# Patient Record
Sex: FEMALE | Race: BLACK OR AFRICAN AMERICAN | NOT HISPANIC OR LATINO | Employment: OTHER | URBAN - METROPOLITAN AREA
[De-identification: names, ages, dates, MRNs, and addresses within clinical notes are randomized per-mention and may not be internally consistent; named-entity substitution may affect disease eponyms.]

---

## 2017-09-04 ENCOUNTER — HOSPITAL ENCOUNTER (INPATIENT)
Facility: OTHER | Age: 52
LOS: 3 days | Discharge: HOME | DRG: 189 | End: 2017-09-08
Attending: EMERGENCY MEDICINE | Admitting: EMERGENCY MEDICINE
Payer: MEDICAID

## 2017-09-04 DIAGNOSIS — E66.01 MORBID OBESITY, UNSPECIFIED OBESITY TYPE: ICD-10-CM

## 2017-09-04 DIAGNOSIS — J20.9 ACUTE BRONCHITIS, UNSPECIFIED ORGANISM: ICD-10-CM

## 2017-09-04 DIAGNOSIS — G47.33 OSA (OBSTRUCTIVE SLEEP APNEA): ICD-10-CM

## 2017-09-04 DIAGNOSIS — J44.1 COPD EXACERBATION: Primary | ICD-10-CM

## 2017-09-04 LAB
ALBUMIN SERPL BCP-MCNC: 4 G/DL
ALLENS TEST: ABNORMAL
ALP SERPL-CCNC: 82 U/L
ALT SERPL W/O P-5'-P-CCNC: 24 U/L
ANION GAP SERPL CALC-SCNC: 13 MMOL/L
AST SERPL-CCNC: 28 U/L
BASOPHILS # BLD AUTO: 0.02 K/UL
BASOPHILS NFR BLD: 0.3 %
BILIRUB SERPL-MCNC: 0.1 MG/DL
BNP SERPL-MCNC: 19 PG/ML
BUN SERPL-MCNC: 11 MG/DL
CALCIUM SERPL-MCNC: 9.8 MG/DL
CHLORIDE SERPL-SCNC: 110 MMOL/L
CO2 SERPL-SCNC: 19 MMOL/L
CREAT SERPL-MCNC: 0.8 MG/DL
DELSYS: ABNORMAL
DIFFERENTIAL METHOD: ABNORMAL
EOSINOPHIL # BLD AUTO: 0.3 K/UL
EOSINOPHIL NFR BLD: 3.9 %
EP: 8
ERYTHROCYTE [DISTWIDTH] IN BLOOD BY AUTOMATED COUNT: 14.5 %
ERYTHROCYTE [SEDIMENTATION RATE] IN BLOOD BY WESTERGREN METHOD: 16 MM/H
EST. GFR  (AFRICAN AMERICAN): >60 ML/MIN/1.73 M^2
EST. GFR  (NON AFRICAN AMERICAN): >60 ML/MIN/1.73 M^2
FIO2: 100
GLUCOSE SERPL-MCNC: 113 MG/DL
HCO3 UR-SCNC: 24.4 MMOL/L (ref 24–28)
HCT VFR BLD AUTO: 35.9 %
HGB BLD-MCNC: 11.7 G/DL
IP: 15
LYMPHOCYTES # BLD AUTO: 2.3 K/UL
LYMPHOCYTES NFR BLD: 33.1 %
MCH RBC QN AUTO: 24.7 PG
MCHC RBC AUTO-ENTMCNC: 32.6 G/DL
MCV RBC AUTO: 76 FL
MIN VOL: 11
MODE: ABNORMAL
MONOCYTES # BLD AUTO: 0.3 K/UL
MONOCYTES NFR BLD: 4 %
NEUTROPHILS # BLD AUTO: 4.1 K/UL
NEUTROPHILS NFR BLD: 58.7 %
PCO2 BLDA: 48.1 MMHG (ref 35–45)
PH SMN: 7.31 [PH] (ref 7.35–7.45)
PLATELET # BLD AUTO: 395 K/UL
PMV BLD AUTO: 9.7 FL
PO2 BLDA: 425 MMHG (ref 80–100)
POC BE: -2 MMOL/L
POC SATURATED O2: 100 % (ref 95–100)
POTASSIUM SERPL-SCNC: 3.7 MMOL/L
PROT SERPL-MCNC: 7.8 G/DL
RBC # BLD AUTO: 4.73 M/UL
SAMPLE: ABNORMAL
SITE: ABNORMAL
SODIUM SERPL-SCNC: 142 MMOL/L
SP02: 97
SPONT RATE: 30
TROPONIN I SERPL DL<=0.01 NG/ML-MCNC: <0.006 NG/ML
WBC # BLD AUTO: 6.95 K/UL

## 2017-09-04 PROCEDURE — 84484 ASSAY OF TROPONIN QUANT: CPT

## 2017-09-04 PROCEDURE — 94645 CONT INHLJ TX EACH ADDL HOUR: CPT

## 2017-09-04 PROCEDURE — 80053 COMPREHEN METABOLIC PANEL: CPT

## 2017-09-04 PROCEDURE — 63600175 PHARM REV CODE 636 W HCPCS: Performed by: EMERGENCY MEDICINE

## 2017-09-04 PROCEDURE — 36600 WITHDRAWAL OF ARTERIAL BLOOD: CPT

## 2017-09-04 PROCEDURE — 25000242 PHARM REV CODE 250 ALT 637 W/ HCPCS

## 2017-09-04 PROCEDURE — 99285 EMERGENCY DEPT VISIT HI MDM: CPT | Mod: 25

## 2017-09-04 PROCEDURE — 96374 THER/PROPH/DIAG INJ IV PUSH: CPT

## 2017-09-04 PROCEDURE — 85025 COMPLETE CBC W/AUTO DIFF WBC: CPT

## 2017-09-04 PROCEDURE — 94660 CPAP INITIATION&MGMT: CPT

## 2017-09-04 PROCEDURE — 99900035 HC TECH TIME PER 15 MIN (STAT)

## 2017-09-04 PROCEDURE — 94644 CONT INHLJ TX 1ST HOUR: CPT

## 2017-09-04 PROCEDURE — 27000190 HC CPAP FULL FACE MASK W/VALVE

## 2017-09-04 PROCEDURE — 93005 ELECTROCARDIOGRAM TRACING: CPT

## 2017-09-04 PROCEDURE — 11000001 HC ACUTE MED/SURG PRIVATE ROOM

## 2017-09-04 PROCEDURE — 83880 ASSAY OF NATRIURETIC PEPTIDE: CPT

## 2017-09-04 PROCEDURE — 93010 ELECTROCARDIOGRAM REPORT: CPT | Mod: ,,, | Performed by: INTERNAL MEDICINE

## 2017-09-04 PROCEDURE — 82803 BLOOD GASES ANY COMBINATION: CPT

## 2017-09-04 RX ORDER — MECLIZINE HYDROCHLORIDE 25 MG/1
25 TABLET ORAL 3 TIMES DAILY PRN
COMMUNITY

## 2017-09-04 RX ORDER — IBUPROFEN 800 MG/1
800 TABLET ORAL 3 TIMES DAILY
Status: ON HOLD | COMMUNITY
End: 2017-09-08 | Stop reason: HOSPADM

## 2017-09-04 RX ORDER — METHYLPREDNISOLONE SOD SUCC 125 MG
125 VIAL (EA) INJECTION ONCE
Status: COMPLETED | OUTPATIENT
Start: 2017-09-04 | End: 2017-09-04

## 2017-09-04 RX ORDER — MONTELUKAST SODIUM 10 MG/1
10 TABLET ORAL NIGHTLY
Status: ON HOLD | COMMUNITY
End: 2023-06-17

## 2017-09-04 RX ORDER — ALBUTEROL SULFATE 0.83 MG/ML
15 SOLUTION RESPIRATORY (INHALATION)
Status: COMPLETED | OUTPATIENT
Start: 2017-09-04 | End: 2017-09-04

## 2017-09-04 RX ORDER — CLONAZEPAM 0.5 MG/1
0.5 TABLET ORAL 2 TIMES DAILY PRN
Status: ON HOLD | COMMUNITY
End: 2023-06-17

## 2017-09-04 RX ORDER — ALBUTEROL SULFATE 0.83 MG/ML
SOLUTION RESPIRATORY (INHALATION)
Status: COMPLETED
Start: 2017-09-04 | End: 2017-09-04

## 2017-09-04 RX ORDER — GABAPENTIN 100 MG/1
100 CAPSULE ORAL 3 TIMES DAILY
Status: ON HOLD | COMMUNITY
End: 2023-06-17

## 2017-09-04 RX ORDER — ATORVASTATIN CALCIUM 40 MG/1
40 TABLET, FILM COATED ORAL DAILY
Status: ON HOLD | COMMUNITY
End: 2023-06-17

## 2017-09-04 RX ORDER — TERBINAFINE HYDROCHLORIDE 250 MG/1
250 TABLET ORAL DAILY
Status: ON HOLD | COMMUNITY
End: 2023-06-17

## 2017-09-04 RX ORDER — SUCRALFATE 1 G/1
1 TABLET ORAL 4 TIMES DAILY
Status: ON HOLD | COMMUNITY
End: 2023-06-17

## 2017-09-04 RX ADMIN — ALBUTEROL SULFATE 15 MG: 0.83 SOLUTION RESPIRATORY (INHALATION) at 11:09

## 2017-09-04 RX ADMIN — ALBUTEROL SULFATE 15 MG: 2.5 SOLUTION RESPIRATORY (INHALATION) at 10:09

## 2017-09-04 RX ADMIN — ALBUTEROL SULFATE 15 MG: 2.5 SOLUTION RESPIRATORY (INHALATION) at 11:09

## 2017-09-04 RX ADMIN — ALBUTEROL SULFATE 15 MG: 0.83 SOLUTION RESPIRATORY (INHALATION) at 10:09

## 2017-09-04 RX ADMIN — METHYLPREDNISOLONE SODIUM SUCCINATE 125 MG: 125 INJECTION, POWDER, FOR SOLUTION INTRAMUSCULAR; INTRAVENOUS at 10:09

## 2017-09-05 LAB
ALLENS TEST: ABNORMAL
HCO3 UR-SCNC: 15.9 MMOL/L (ref 24–28)
PCO2 BLDA: 31.6 MMHG (ref 35–45)
PH SMN: 7.31 [PH] (ref 7.35–7.45)
PO2 BLDA: 67 MMHG (ref 80–100)
POC BE: -10 MMOL/L
POC SATURATED O2: 92 % (ref 95–100)
SAMPLE: ABNORMAL
SITE: ABNORMAL
TROPONIN I SERPL DL<=0.01 NG/ML-MCNC: <0.006 NG/ML

## 2017-09-05 PROCEDURE — 82803 BLOOD GASES ANY COMBINATION: CPT

## 2017-09-05 PROCEDURE — 99223 1ST HOSP IP/OBS HIGH 75: CPT | Mod: ,,, | Performed by: INTERNAL MEDICINE

## 2017-09-05 PROCEDURE — 36600 WITHDRAWAL OF ARTERIAL BLOOD: CPT

## 2017-09-05 PROCEDURE — 27000221 HC OXYGEN, UP TO 24 HOURS

## 2017-09-05 PROCEDURE — 94640 AIRWAY INHALATION TREATMENT: CPT

## 2017-09-05 PROCEDURE — 25000242 PHARM REV CODE 250 ALT 637 W/ HCPCS: Performed by: EMERGENCY MEDICINE

## 2017-09-05 PROCEDURE — 84484 ASSAY OF TROPONIN QUANT: CPT

## 2017-09-05 PROCEDURE — 94660 CPAP INITIATION&MGMT: CPT

## 2017-09-05 PROCEDURE — 27000190 HC CPAP FULL FACE MASK W/VALVE

## 2017-09-05 PROCEDURE — 11000001 HC ACUTE MED/SURG PRIVATE ROOM

## 2017-09-05 PROCEDURE — 99900035 HC TECH TIME PER 15 MIN (STAT)

## 2017-09-05 PROCEDURE — 25000003 PHARM REV CODE 250: Performed by: INTERNAL MEDICINE

## 2017-09-05 PROCEDURE — 25000003 PHARM REV CODE 250: Performed by: NURSE PRACTITIONER

## 2017-09-05 PROCEDURE — 63600175 PHARM REV CODE 636 W HCPCS: Performed by: NURSE PRACTITIONER

## 2017-09-05 PROCEDURE — 25000242 PHARM REV CODE 250 ALT 637 W/ HCPCS: Performed by: NURSE PRACTITIONER

## 2017-09-05 PROCEDURE — 94645 CONT INHLJ TX EACH ADDL HOUR: CPT

## 2017-09-05 RX ORDER — ONDANSETRON 2 MG/ML
4 INJECTION INTRAMUSCULAR; INTRAVENOUS EVERY 12 HOURS PRN
Status: DISCONTINUED | OUTPATIENT
Start: 2017-09-05 | End: 2017-09-08 | Stop reason: HOSPADM

## 2017-09-05 RX ORDER — PREDNISONE 20 MG/1
60 TABLET ORAL DAILY
Status: DISCONTINUED | OUTPATIENT
Start: 2017-09-05 | End: 2017-09-08 | Stop reason: HOSPADM

## 2017-09-05 RX ORDER — ATORVASTATIN CALCIUM 20 MG/1
40 TABLET, FILM COATED ORAL DAILY
Status: DISCONTINUED | OUTPATIENT
Start: 2017-09-05 | End: 2017-09-08 | Stop reason: HOSPADM

## 2017-09-05 RX ORDER — ACETAMINOPHEN 325 MG/1
650 TABLET ORAL EVERY 4 HOURS PRN
Status: DISCONTINUED | OUTPATIENT
Start: 2017-09-05 | End: 2017-09-08 | Stop reason: HOSPADM

## 2017-09-05 RX ORDER — ALBUTEROL SULFATE 0.83 MG/ML
15 SOLUTION RESPIRATORY (INHALATION)
Status: COMPLETED | OUTPATIENT
Start: 2017-09-05 | End: 2017-09-05

## 2017-09-05 RX ORDER — TIOTROPIUM BROMIDE 18 UG/1
18 CAPSULE ORAL; RESPIRATORY (INHALATION) DAILY
Status: DISCONTINUED | OUTPATIENT
Start: 2017-09-05 | End: 2017-09-08 | Stop reason: HOSPADM

## 2017-09-05 RX ORDER — IPRATROPIUM BROMIDE AND ALBUTEROL SULFATE 2.5; .5 MG/3ML; MG/3ML
3 SOLUTION RESPIRATORY (INHALATION) EVERY 4 HOURS PRN
Status: DISCONTINUED | OUTPATIENT
Start: 2017-09-05 | End: 2017-09-08 | Stop reason: HOSPADM

## 2017-09-05 RX ORDER — FLUTICASONE FUROATE AND VILANTEROL 100; 25 UG/1; UG/1
1 POWDER RESPIRATORY (INHALATION) DAILY
Status: DISCONTINUED | OUTPATIENT
Start: 2017-09-05 | End: 2017-09-08 | Stop reason: HOSPADM

## 2017-09-05 RX ORDER — ENOXAPARIN SODIUM 100 MG/ML
40 INJECTION SUBCUTANEOUS EVERY 24 HOURS
Status: DISCONTINUED | OUTPATIENT
Start: 2017-09-05 | End: 2017-09-08 | Stop reason: HOSPADM

## 2017-09-05 RX ORDER — IPRATROPIUM BROMIDE AND ALBUTEROL SULFATE 2.5; .5 MG/3ML; MG/3ML
3 SOLUTION RESPIRATORY (INHALATION) EVERY 4 HOURS
Status: DISCONTINUED | OUTPATIENT
Start: 2017-09-05 | End: 2017-09-08 | Stop reason: HOSPADM

## 2017-09-05 RX ORDER — CLONAZEPAM 0.5 MG/1
0.5 TABLET ORAL 2 TIMES DAILY
Status: DISCONTINUED | OUTPATIENT
Start: 2017-09-05 | End: 2017-09-08 | Stop reason: HOSPADM

## 2017-09-05 RX ORDER — GABAPENTIN 100 MG/1
100 CAPSULE ORAL 3 TIMES DAILY
Status: DISCONTINUED | OUTPATIENT
Start: 2017-09-05 | End: 2017-09-08 | Stop reason: HOSPADM

## 2017-09-05 RX ORDER — FAMOTIDINE 20 MG/1
20 TABLET, FILM COATED ORAL 2 TIMES DAILY
Status: DISCONTINUED | OUTPATIENT
Start: 2017-09-05 | End: 2017-09-08 | Stop reason: HOSPADM

## 2017-09-05 RX ORDER — DOXYCYCLINE HYCLATE 100 MG
100 TABLET ORAL DAILY
Status: DISCONTINUED | OUTPATIENT
Start: 2017-09-05 | End: 2017-09-08 | Stop reason: HOSPADM

## 2017-09-05 RX ORDER — MONTELUKAST SODIUM 10 MG/1
10 TABLET ORAL NIGHTLY
Status: DISCONTINUED | OUTPATIENT
Start: 2017-09-05 | End: 2017-09-08 | Stop reason: HOSPADM

## 2017-09-05 RX ORDER — DOXYCYCLINE 100 MG/1
100 CAPSULE ORAL EVERY 12 HOURS
Status: DISCONTINUED | OUTPATIENT
Start: 2017-09-05 | End: 2017-09-05

## 2017-09-05 RX ORDER — SUCRALFATE 1 G/1
1 TABLET ORAL 4 TIMES DAILY
Status: DISCONTINUED | OUTPATIENT
Start: 2017-09-05 | End: 2017-09-08 | Stop reason: HOSPADM

## 2017-09-05 RX ADMIN — MONTELUKAST SODIUM 10 MG: 10 TABLET, FILM COATED ORAL at 09:09

## 2017-09-05 RX ADMIN — GABAPENTIN 100 MG: 100 CAPSULE ORAL at 01:09

## 2017-09-05 RX ADMIN — ONDANSETRON 4 MG: 2 INJECTION INTRAMUSCULAR; INTRAVENOUS at 09:09

## 2017-09-05 RX ADMIN — FAMOTIDINE 20 MG: 20 TABLET, FILM COATED ORAL at 08:09

## 2017-09-05 RX ADMIN — GABAPENTIN 100 MG: 100 CAPSULE ORAL at 06:09

## 2017-09-05 RX ADMIN — ALBUTEROL SULFATE 15 MG: 2.5 SOLUTION RESPIRATORY (INHALATION) at 01:09

## 2017-09-05 RX ADMIN — SUCRALFATE 1 G: 1 TABLET ORAL at 05:09

## 2017-09-05 RX ADMIN — IPRATROPIUM BROMIDE AND ALBUTEROL SULFATE 3 ML: .5; 3 SOLUTION RESPIRATORY (INHALATION) at 07:09

## 2017-09-05 RX ADMIN — CLONAZEPAM 0.5 MG: 0.5 TABLET ORAL at 09:09

## 2017-09-05 RX ADMIN — IPRATROPIUM BROMIDE AND ALBUTEROL SULFATE 3 ML: .5; 3 SOLUTION RESPIRATORY (INHALATION) at 11:09

## 2017-09-05 RX ADMIN — DOXYCYCLINE HYCLATE 100 MG: 100 TABLET, COATED ORAL at 08:09

## 2017-09-05 RX ADMIN — GABAPENTIN 100 MG: 100 CAPSULE ORAL at 09:09

## 2017-09-05 RX ADMIN — ENOXAPARIN SODIUM 40 MG: 100 INJECTION SUBCUTANEOUS at 05:09

## 2017-09-05 RX ADMIN — CLONAZEPAM 0.5 MG: 0.5 TABLET ORAL at 11:09

## 2017-09-05 RX ADMIN — PREDNISONE 60 MG: 20 TABLET ORAL at 08:09

## 2017-09-05 RX ADMIN — SUCRALFATE 1 G: 1 TABLET ORAL at 06:09

## 2017-09-05 RX ADMIN — TIOTROPIUM BROMIDE 18 MCG: 18 CAPSULE ORAL; RESPIRATORY (INHALATION) at 07:09

## 2017-09-05 RX ADMIN — FAMOTIDINE 20 MG: 20 TABLET, FILM COATED ORAL at 09:09

## 2017-09-05 RX ADMIN — SUCRALFATE 1 G: 1 TABLET ORAL at 11:09

## 2017-09-05 RX ADMIN — IPRATROPIUM BROMIDE AND ALBUTEROL SULFATE 3 ML: .5; 3 SOLUTION RESPIRATORY (INHALATION) at 03:09

## 2017-09-05 RX ADMIN — ATORVASTATIN CALCIUM 40 MG: 20 TABLET, FILM COATED ORAL at 08:09

## 2017-09-05 RX ADMIN — FLUTICASONE FUROATE AND VILANTEROL TRIFENATATE 1 PUFF: 100; 25 POWDER RESPIRATORY (INHALATION) at 07:09

## 2017-09-05 NOTE — ED NOTES
"Pt continues to disconnect herself from the EKG monitor and continues to take her B/P cuff off. Pt advised she needed to be monitored continually while she is in the ER. Pt said, "I can't have all them wires on me".   "

## 2017-09-05 NOTE — ED NOTES
Rounding on the pt has been done. Pt is A & O x 3, appropriate and resting comfortably. Pt is currently receiving a breathing treatment. Skin is warm and dry w/ pink mucosa. NO redness, flushing or diaphoresis observed. Pt denies fever, chills and N/V/D at this time. VS. The pt has been updated on the POC and is ready for admit. Pt pain was addressed and is a 0/10. Pt is currently in position of comfort and covered w/ a blanket for warmth. Pt denies the need for the restroom at this time. Call bell is w/in reach. The pt was advised when a reassessment would take place and pt agreed. Will continue to monitor closely.

## 2017-09-05 NOTE — ED PROVIDER NOTES
"Encounter Date: 9/4/2017    SCRIBE #1 NOTE: I, Thais Retana , am scribing for, and in the presence of, Dr. Hastings.       History     Chief Complaint   Patient presents with    Shortness of Breath     w/ wheezing, came in on CPAP, unable to talk, denies chest pain     Time seen by provider: 10:23 PM    This is a 52 y.o. Female, with history of asthma and COPD, who presents via EMS with complaint of shortness of breath that began two days ago. The patient's sister notified EMS after SOB progressively worsened. Per EMS, BP was 150/110. CPAP machine put in place en route. Pt denies chest pain. She denies use of home oxygen, and is compliant with medication. Pt was intubated one week ago.        The history is provided by the patient and the EMS personnel. The history is limited by the condition of the patient.     Review of patient's allergies indicates:   Allergen Reactions    Aspirin Anaphylaxis    Dilaudid [hydromorphone (bulk)] Other (See Comments)     Pt states dilaudid "makes me paranoid - real spooked". Denies s/s anaphylaxis.      Past Medical History:   Diagnosis Date    Anxiety     Asthma     Bronchitis     COPD (chronic obstructive pulmonary disease)     Depression     GERD (gastroesophageal reflux disease)     Hypertension     Schizophrenia      Past Surgical History:   Procedure Laterality Date    HERNIA REPAIR      none       Family History   Problem Relation Age of Onset    Hypertension Mother     Pneumonia Mother     Hypertension Father     Cancer Father     Diabetes Father      Social History   Substance Use Topics    Smoking status: Former Smoker     Packs/day: 3.00     Types: Cigarettes     Quit date: 7/1/2016    Smokeless tobacco: Never Used      Comment: Smoked up to 3 ppd for 33 years.    Alcohol use Yes      Comment: occassional     Review of Systems   Unable to perform ROS: Severe respiratory distress       Physical Exam     Initial Vitals [09/04/17 2227]   BP Pulse Resp Temp " SpO2   121/84 (!) 114 (!) 30 97.4 °F (36.3 °C) 99 %      MAP       96.33         Physical Exam    Nursing note and vitals reviewed.  Constitutional: She appears well-developed and well-nourished. She is not diaphoretic. She appears distressed.   HENT:   Head: Normocephalic and atraumatic.   Right Ear: External ear normal.   Left Ear: External ear normal.   Eyes: EOM are normal. Right eye exhibits no discharge. Left eye exhibits no discharge.   Neck: Normal range of motion.   Cardiovascular: Regular rhythm and normal heart sounds. Tachycardia present.  Exam reveals no gallop and no friction rub.    No murmur heard.  Pulmonary/Chest: She is in respiratory distress. She has wheezes. She has no rhonchi. She has no rales.   Pt arrives on CPAP. Diffuse wheezes. Mild respiratory distress.    Abdominal: Soft. There is no tenderness. There is no rebound and no guarding.   Musculoskeletal: Normal range of motion. She exhibits no edema or tenderness.   No lower extremity edema.   Neurological: She is alert and oriented to person, place, and time.   Skin: Skin is warm and dry. No rash and no abscess noted. No erythema. No pallor.   Psychiatric: She has a normal mood and affect. Her behavior is normal. Judgment and thought content normal.         ED Course   Critical Care  Date/Time: 9/4/2017 10:35 PM  Performed by: ANIVAL BRYANT.  Authorized by: ANIVAL BRYANT   Direct patient critical care time: 15 minutes  Additional history critical care time: 10 minutes  Ordering / reviewing critical care time: 10 minutes  Documentation critical care time: 5 minutes  Consulting other physicians critical care time: 5 minutes  Total critical care time (exclusive of procedural time) : 45 minutes  Critical care time was exclusive of separately billable procedures and treating other patients.  Critical care was necessary to treat or prevent imminent or life-threatening deterioration of the following conditions: respiratory failure.  Critical  care was time spent personally by me on the following activities: examination of patient, obtaining history from patient or surrogate, evaluation of patient's response to treatment, interpretation of cardiac output measurements, ordering and performing treatments and interventions, ordering and review of laboratory studies, ordering and review of radiographic studies, re-evaluation of patient's condition, review of old charts and discussions with consultants.        Labs Reviewed   CBC W/ AUTO DIFFERENTIAL - Abnormal; Notable for the following:        Result Value    Hemoglobin 11.7 (*)     Hematocrit 35.9 (*)     MCV 76 (*)     MCH 24.7 (*)     Platelets 395 (*)     All other components within normal limits   COMPREHENSIVE METABOLIC PANEL - Abnormal; Notable for the following:     CO2 19 (*)     Glucose 113 (*)     All other components within normal limits   ISTAT PROCEDURE - Abnormal; Notable for the following:     POC PH 7.313 (*)     POC PCO2 48.1 (*)     POC PO2 425 (*)     All other components within normal limits   TROPONIN I   B-TYPE NATRIURETIC PEPTIDE   TROPONIN I     Imaging Results          X-Ray Chest AP Portable (Final result)  Result time 09/04/17 22:53:12    Final result by Anh Mehta MD (09/04/17 22:53:12)                 Impression:      As above.      Electronically signed by: ANH MEHTA MD  Date:     09/04/17  Time:    22:53              Narrative:    Chest AP single view portable.  Comparison: 12/2016.    Cardiac silhouette is stable in size.  Lungs are hypoinflated which accentuates pulmonary vascular markings.  Difficult to exclude potential mild pulmonary edema.  No evidence of focal consolidation, pneumothorax, or large effusion.  Bones show DJD.                              EKG Readings: (Independently Interpreted)   Initial Reading: No STEMI.   Normal sinus rhythm at a rate of 94 bpm. Low voltage.        X-Rays:   Independently Interpreted Readings:   Chest X-Ray: No effusions or  opacities.     Medical Decision Making:   Clinical Tests:   Lab Tests: Ordered and Reviewed  Radiological Study: Ordered and Reviewed  Medical Tests: Ordered and Reviewed  ED Management:  Chronically ill-appearing patient transfer by EMS.  Arrives in respiratory distress.  CPAP on.  As workup proceeds it seems more of a COPD exacerbation rather than a CHF picture.  BNP less than 100.  Has some improvement with that one hour breathing treatment.  Able to remove BiPAP.  Still very wheezy with transient hypoxia.  Will require more related treatments.  Hospitalized with the [facility] service.    ONDINA Hastings M.D.  09/05/2017  3:52 AM      2:26 AM- Discussed and consulted case with Prieto MASON, who will accept the admission.             Scribe Attestation:   Scribe #1: I performed the above scribed service and the documentation accurately describes the services I performed. I attest to the accuracy of the note.    Attending Attestation:           Physician Attestation for Scribe:  Physician Attestation Statement for Scribe #1: I, Dr. Hastings, reviewed documentation, as scribed by Thais Retana  in my presence, and it is both accurate and complete.                 ED Course      Clinical Impression:     1. COPD exacerbation                                 Laureano Hastings MD  09/05/17 0353

## 2017-09-05 NOTE — PLAN OF CARE
CM met with pt for initial discharge planning assessment. Pt states she lives alone, but has family who assists. Pt states she had a sleep study on Aug 29, 2017, at [facility] on [address], [phone] and is waiting for results. CM spoke with Denise at [facility], who will fax me the results which should be ready by tomorrow. Pt signed MJ form for CM to fax to [facility].   CM added pt's new PCP to Epic form. Pt likely to need home O2 for discharge as well as BiPap. CM to follow for arrangements.     09/05/17 1612   Discharge Assessment   Assessment Type Discharge Planning Assessment   Confirmed/corrected address and phone number on facesheet? Yes   Assessment information obtained from? Patient;Medical Record   Expected Length of Stay (days) 3   Prior to hospitilization cognitive status: Alert/Oriented   Prior to hospitalization functional status: Independent   Current cognitive status: Alert/Oriented   Current Functional Status: Independent   Facility Arrived From: home   Lives With alone   Able to Return to Prior Arrangements yes   Is patient able to care for self after discharge? Yes   Readmission Within The Last 30 Days no previous admission in last 30 days   Patient currently being followed by outpatient case management? No   Patient currently receives any other outside agency services? No   Equipment Currently Used at Home cane, straight;nebulizer   Do you have any problems affording any of your prescribed medications? No   Is the patient taking medications as prescribed? yes   Does the patient have transportation home? Yes   Does the patient receive services at the [facility]? No   Discharge Plan A Home   Discharge Plan B Home   Patient/Family In Agreement With Plan yes

## 2017-09-05 NOTE — HPI
This is a 52 y.o. Female, with history of asthma and COPD, who presents via EMS with complaint of shortness of breath that began two days ago. The patient's sister notified EMS after SOB progressively worsened. Per EMS, BP was 150/110. CPAP machine put in place en route. Pt denies chest pain. She denies use of home oxygen, and is compliant with medication. Pt was intubated one week ago at another facility.

## 2017-09-05 NOTE — ASSESSMENT & PLAN NOTE
PCO2-48  Bipap 10/5   Prednisone 60 mg x 5 days  Duonebs Q4 PRN  Continue home Breo, Singulair, and Spiriva  Doxycycline 100mg daily

## 2017-09-05 NOTE — SUBJECTIVE & OBJECTIVE
"Past Medical History:   Diagnosis Date    Anxiety     Asthma     Bronchitis     COPD (chronic obstructive pulmonary disease)     Depression     GERD (gastroesophageal reflux disease)     Hypertension     Schizophrenia        Past Surgical History:   Procedure Laterality Date    HERNIA REPAIR      none         Review of patient's allergies indicates:   Allergen Reactions    Aspirin Anaphylaxis    Dilaudid [hydromorphone (bulk)] Other (See Comments)     Pt states dilaudid "makes me paranoid - real spooked". Denies s/s anaphylaxis.        No current facility-administered medications on file prior to encounter.      Current Outpatient Prescriptions on File Prior to Encounter   Medication Sig    albuterol 90 mcg/actuation inhaler Inhale 2 puffs into the lungs every 4 (four) hours as needed for Wheezing or Shortness of Breath.    albuterol-ipratropium 2.5mg-0.5mg/3mL (DUO-NEB) 0.5 mg-3 mg(2.5 mg base)/3 mL nebulizer solution Take 3 mLs by nebulization every 4 (four) hours as needed for Wheezing or Shortness of Breath.    amlodipine (NORVASC) 10 MG tablet Take 1 tablet (10 mg total) by mouth once daily.    famotidine (PEPCID AC) 20 MG tablet Take 1 tablet (20 mg total) by mouth 2 (two) times daily.    fluticasone-vilanterol (BREO) 100-25 mcg/dose diskus inhaler Inhale 1 puff into the lungs once daily.    losartan (COZAAR) 100 MG tablet Take 100 mg by mouth once daily.    tiotropium (SPIRIVA) 18 mcg inhalation capsule Inhale 1 capsule (18 mcg total) into the lungs once daily.    tramadol (ULTRAM) 50 mg tablet Take 1 tablet (50 mg total) by mouth 2 (two) times daily as needed for Pain.     Family History     Problem Relation (Age of Onset)    Cancer Father    Diabetes Father    Hypertension Mother, Father    Pneumonia Mother        Social History Main Topics    Smoking status: Former Smoker     Packs/day: 3.00     Types: Cigarettes     Quit date: 7/1/2016    Smokeless tobacco: Never Used      Comment: " Smoked up to 3 ppd for 33 years.    Alcohol use Yes      Comment: occassional    Drug use: No    Sexual activity: No     Review of Systems   Constitutional: Positive for activity change, appetite change, fatigue and fever.   HENT: Negative for congestion, ear pain, rhinorrhea and sinus pressure.    Eyes: Negative for pain and discharge.   Respiratory: Positive for cough, chest tightness, shortness of breath and wheezing.    Cardiovascular: Negative for chest pain and leg swelling.   Gastrointestinal: Negative for abdominal distention, abdominal pain, diarrhea, nausea and vomiting.   Endocrine: Negative for cold intolerance and heat intolerance.   Genitourinary: Negative for difficulty urinating, flank pain, frequency, hematuria and urgency.   Musculoskeletal: Negative for arthralgias, joint swelling and myalgias.   Skin: Positive for pallor.   Allergic/Immunologic: Negative for environmental allergies and food allergies.   Neurological: Negative for dizziness, weakness, light-headedness and headaches.   Hematological: Does not bruise/bleed easily.   Psychiatric/Behavioral: Negative for agitation, behavioral problems and decreased concentration.     Objective:     Vital Signs (Most Recent):  Temp: 97.4 °F (36.3 °C) (09/04/17 2227)  Pulse: 94 (09/05/17 0155)  Resp: 20 (09/05/17 0155)  BP: (!) 96/46 (09/05/17 0016)  SpO2: 96 % (09/05/17 0016) Vital Signs (24h Range):  Temp:  [97.4 °F (36.3 °C)] 97.4 °F (36.3 °C)  Pulse:  [] 94  Resp:  [17-44] 20  SpO2:  [96 %-99 %] 96 %  BP: ()/(46-84) 96/46     Weight: 104.3 kg (230 lb)  Body mass index is 40.74 kg/m².    Physical Exam   Constitutional: She is oriented to person, place, and time. She appears well-developed and well-nourished. She appears lethargic.   HENT:   Head: Normocephalic.   Eyes: Conjunctivae are normal. Right eye exhibits no discharge. Left eye exhibits no discharge.   Neck: Normal range of motion. Neck supple.   Cardiovascular: Normal rate,  regular rhythm, normal heart sounds and intact distal pulses.    Pulmonary/Chest: Tachypnea noted. She is in respiratory distress. She has wheezes in the left upper field. She has rhonchi in the left upper field.   Abdominal: Soft. Bowel sounds are normal. She exhibits no distension. There is no tenderness.   Musculoskeletal: Normal range of motion.   Neurological: She is oriented to person, place, and time. She has normal strength. She appears lethargic. GCS eye subscore is 4. GCS verbal subscore is 5. GCS motor subscore is 6.   Skin: Skin is warm, dry and intact. Capillary refill takes 2 to 3 seconds.   Psychiatric: She has a normal mood and affect. Her speech is normal and behavior is normal. Judgment and thought content normal. Cognition and memory are normal.        Significant Labs:   CBC:   Recent Labs  Lab 09/04/17  2241   WBC 6.95   HGB 11.7*   HCT 35.9*   *     CMP:   Recent Labs  Lab 09/04/17  2241      K 3.7      CO2 19*   *   BUN 11   CREATININE 0.8   CALCIUM 9.8   PROT 7.8   ALBUMIN 4.0   BILITOT 0.1   ALKPHOS 82   AST 28   ALT 24   ANIONGAP 13   EGFRNONAA >60       Significant Imaging: I have reviewed all pertinent imaging results/findings within the past 24 hours.

## 2017-09-05 NOTE — ED NOTES
Pt c/o SOB x 2 days, progressively getting worse. Pt is A & O x 3, denies chest pain, fever, chills, productive cough, congestion and N/V/D. Obvious respiratory distress noted. Respirations are even and labored.   + nasal flaring and no use of accessory muscles. Pt is able to maintain own airway. Audible wheezing observed. Skin is warm and dry w/ pink mucosa. Skin is not cyanotic,clammy or diaphoretic. No redness or flushing to the face. VS. SID x 3mm. No JVD. BBS -wet w/ wheezing to all lobes. All fields equal upon auscultation. =chest rise observed. Abd- SNT. BS x 4 quadrants. Pt denies dysuria and constipation. PSM x 4 exts. STALLWORTH w/out difficulty. +2 pulses x 4 exts. No swelling, redness, difference in temperature or deformity to exts x 4. Bed is locked and in the low position w/ the side rails up and locked for safety. Call bell @ BS. Will continue to monitor closely.

## 2017-09-05 NOTE — PROGRESS NOTES
Was called to pt's room @ 0650 AM to place pt on BIPAP due to pt complaining of SOB. Treatments given along with DPI's with LANCE. Pt tolerated therapy well. Pt was on BIPAP throughout the day with minimal breaks in between to eat or conversate with family. Pt tolerated BIPAP well. No changes made. Will continue to monitor.

## 2017-09-05 NOTE — ED NOTES
Pt c/o the tech brought her the wrong crackers, the white ones, pt request the shauna crackers. Pt was told she would receive the shauna crackers at the earliest possible convenience possible. Pt was satisfied.

## 2017-09-05 NOTE — ED NOTES
Rounding on the pt has been done. Pt is A & O x 3, appropriate and resting comfortably. Pt states she is breathing a little easier and respirations are even and unlabored. Skin is warm and dry w/ pink mucosa. NO redness, flushing or diaphoresis observed. Pt denies fever, chills and N/V/D at this time. VS. The pt has been updated on the POC. Pt pain was addressed and is a 0/10. Pt is currently in position of comfort and covered w/ a blanket for warmth. Pt denies the need for the restroom at this time. Call bell is w/in reach. The pt was advised when a reassessment would take place and pt agreed. Will continue to monitor closely.

## 2017-09-05 NOTE — ED NOTES
Pt up to BS commode w/out assistance. Pt continues to have audible wheezing to all lobes. Pt is able to speak in complete sentences. Pt is A & O x 3, states her SOB is better than when she arrived. Respirations are even and unlabored, no use of accessory muscles and no nasal flaring. Skin is warm and dry w/ pink mucosa. VS. Pt continues to get a breathing treatment w/ RT @ BS. Bed is locked and in the low position w/ the side rails up and locked for safety. Will continue to monitor closely.

## 2017-09-05 NOTE — ED NOTES
Pt L lateral recumbent on stretcher c/o hunger pains and requesting shauna crackers. Pt denies SOB, no use of accessory muscles and respirations are even and unlabored. Skin is warm and dry w/ pink mucosa. VS. Pt has no complaint at present. Pt continues to get out of bed when she pleases. Bed is locked and in the low position w/ the side rails up and locked for safety, but pt continues to get out of bed. Will continue to monitor closely.

## 2017-09-05 NOTE — PLAN OF CARE
Problem: Patient Care Overview  Goal: Plan of Care Review  Outcome: Ongoing (interventions implemented as appropriate)  Patient on RA sats 94%. Could not tolerate Bipap due to anxiety.

## 2017-09-05 NOTE — PLAN OF CARE
09/05/17 1021   ED Admissions Case Approval   ED Admissions Case Approval (!) CM Approved  (IP )

## 2017-09-05 NOTE — PLAN OF CARE
Problem: Patient Care Overview  Goal: Plan of Care Review  Outcome: Ongoing (interventions implemented as appropriate)  Plan of care reviewed with patient. Patients VSS. Call light and personal belongings within reach. Patient tolerating regular diet. Patient on continuous BiPAP as ordered. Patient educated on fall risk and fall risk prevention. She has been encouraged to turn Q2. Patient has no c/o of pain at this time. Will continue to monitor.

## 2017-09-06 PROBLEM — G47.33 OSA (OBSTRUCTIVE SLEEP APNEA): Status: ACTIVE | Noted: 2017-09-06

## 2017-09-06 PROBLEM — E66.01 MORBID OBESITY: Status: ACTIVE | Noted: 2017-09-06

## 2017-09-06 LAB
ALBUMIN SERPL BCP-MCNC: 3.7 G/DL
ALP SERPL-CCNC: 73 U/L
ALT SERPL W/O P-5'-P-CCNC: 25 U/L
ANION GAP SERPL CALC-SCNC: 9 MMOL/L
AST SERPL-CCNC: 22 U/L
BASOPHILS # BLD AUTO: 0.01 K/UL
BASOPHILS NFR BLD: 0.1 %
BILIRUB SERPL-MCNC: 0.1 MG/DL
BUN SERPL-MCNC: 13 MG/DL
CALCIUM SERPL-MCNC: 10.1 MG/DL
CHLORIDE SERPL-SCNC: 108 MMOL/L
CO2 SERPL-SCNC: 22 MMOL/L
CREAT SERPL-MCNC: 0.8 MG/DL
DIFFERENTIAL METHOD: ABNORMAL
EOSINOPHIL # BLD AUTO: 0 K/UL
EOSINOPHIL NFR BLD: 0 %
ERYTHROCYTE [DISTWIDTH] IN BLOOD BY AUTOMATED COUNT: 15.4 %
EST. GFR  (AFRICAN AMERICAN): >60 ML/MIN/1.73 M^2
EST. GFR  (NON AFRICAN AMERICAN): >60 ML/MIN/1.73 M^2
GLUCOSE SERPL-MCNC: 119 MG/DL
HCT VFR BLD AUTO: 35.5 %
HGB BLD-MCNC: 11.3 G/DL
LYMPHOCYTES # BLD AUTO: 1.5 K/UL
LYMPHOCYTES NFR BLD: 12.1 %
MAGNESIUM SERPL-MCNC: 1.9 MG/DL
MCH RBC QN AUTO: 24.8 PG
MCHC RBC AUTO-ENTMCNC: 31.8 G/DL
MCV RBC AUTO: 78 FL
MONOCYTES # BLD AUTO: 1 K/UL
MONOCYTES NFR BLD: 7.8 %
NEUTROPHILS # BLD AUTO: 9.9 K/UL
NEUTROPHILS NFR BLD: 79.2 %
PHOSPHATE SERPL-MCNC: 2.6 MG/DL
PLATELET # BLD AUTO: 384 K/UL
PMV BLD AUTO: 10 FL
POTASSIUM SERPL-SCNC: 3.8 MMOL/L
PROT SERPL-MCNC: 7.4 G/DL
RBC # BLD AUTO: 4.56 M/UL
SODIUM SERPL-SCNC: 139 MMOL/L
WBC # BLD AUTO: 12.52 K/UL

## 2017-09-06 PROCEDURE — 83735 ASSAY OF MAGNESIUM: CPT

## 2017-09-06 PROCEDURE — 25000003 PHARM REV CODE 250: Performed by: NURSE PRACTITIONER

## 2017-09-06 PROCEDURE — 63600175 PHARM REV CODE 636 W HCPCS: Performed by: NURSE PRACTITIONER

## 2017-09-06 PROCEDURE — 80053 COMPREHEN METABOLIC PANEL: CPT

## 2017-09-06 PROCEDURE — 99233 SBSQ HOSP IP/OBS HIGH 50: CPT | Mod: ,,, | Performed by: INTERNAL MEDICINE

## 2017-09-06 PROCEDURE — 94660 CPAP INITIATION&MGMT: CPT

## 2017-09-06 PROCEDURE — 11000001 HC ACUTE MED/SURG PRIVATE ROOM

## 2017-09-06 PROCEDURE — 85025 COMPLETE CBC W/AUTO DIFF WBC: CPT

## 2017-09-06 PROCEDURE — 99900035 HC TECH TIME PER 15 MIN (STAT)

## 2017-09-06 PROCEDURE — 36415 COLL VENOUS BLD VENIPUNCTURE: CPT

## 2017-09-06 PROCEDURE — 94761 N-INVAS EAR/PLS OXIMETRY MLT: CPT

## 2017-09-06 PROCEDURE — 27000221 HC OXYGEN, UP TO 24 HOURS

## 2017-09-06 PROCEDURE — 84100 ASSAY OF PHOSPHORUS: CPT

## 2017-09-06 PROCEDURE — 25000242 PHARM REV CODE 250 ALT 637 W/ HCPCS: Performed by: NURSE PRACTITIONER

## 2017-09-06 PROCEDURE — 25000003 PHARM REV CODE 250: Performed by: INTERNAL MEDICINE

## 2017-09-06 PROCEDURE — 94640 AIRWAY INHALATION TREATMENT: CPT

## 2017-09-06 RX ORDER — BENZONATATE 100 MG/1
100 CAPSULE ORAL 3 TIMES DAILY
Status: DISCONTINUED | OUTPATIENT
Start: 2017-09-07 | End: 2017-09-06

## 2017-09-06 RX ORDER — BENZONATATE 100 MG/1
100 CAPSULE ORAL 3 TIMES DAILY
Status: DISCONTINUED | OUTPATIENT
Start: 2017-09-06 | End: 2017-09-08 | Stop reason: HOSPADM

## 2017-09-06 RX ADMIN — SUCRALFATE 1 G: 1 TABLET ORAL at 12:09

## 2017-09-06 RX ADMIN — GABAPENTIN 100 MG: 100 CAPSULE ORAL at 01:09

## 2017-09-06 RX ADMIN — IPRATROPIUM BROMIDE AND ALBUTEROL SULFATE 3 ML: .5; 3 SOLUTION RESPIRATORY (INHALATION) at 08:09

## 2017-09-06 RX ADMIN — SUCRALFATE 1 G: 1 TABLET ORAL at 05:09

## 2017-09-06 RX ADMIN — CLONAZEPAM 0.5 MG: 0.5 TABLET ORAL at 08:09

## 2017-09-06 RX ADMIN — PREDNISONE 60 MG: 20 TABLET ORAL at 08:09

## 2017-09-06 RX ADMIN — FLUTICASONE FUROATE AND VILANTEROL TRIFENATATE 1 PUFF: 100; 25 POWDER RESPIRATORY (INHALATION) at 08:09

## 2017-09-06 RX ADMIN — FAMOTIDINE 20 MG: 20 TABLET, FILM COATED ORAL at 08:09

## 2017-09-06 RX ADMIN — ENOXAPARIN SODIUM 40 MG: 100 INJECTION SUBCUTANEOUS at 05:09

## 2017-09-06 RX ADMIN — IPRATROPIUM BROMIDE AND ALBUTEROL SULFATE 3 ML: .5; 3 SOLUTION RESPIRATORY (INHALATION) at 12:09

## 2017-09-06 RX ADMIN — SUCRALFATE 1 G: 1 TABLET ORAL at 10:09

## 2017-09-06 RX ADMIN — TIOTROPIUM BROMIDE 18 MCG: 18 CAPSULE ORAL; RESPIRATORY (INHALATION) at 08:09

## 2017-09-06 RX ADMIN — GABAPENTIN 100 MG: 100 CAPSULE ORAL at 05:09

## 2017-09-06 RX ADMIN — SUCRALFATE 1 G: 1 TABLET ORAL at 11:09

## 2017-09-06 RX ADMIN — ATORVASTATIN CALCIUM 40 MG: 20 TABLET, FILM COATED ORAL at 08:09

## 2017-09-06 RX ADMIN — GABAPENTIN 100 MG: 100 CAPSULE ORAL at 10:09

## 2017-09-06 RX ADMIN — IPRATROPIUM BROMIDE AND ALBUTEROL SULFATE 3 ML: .5; 3 SOLUTION RESPIRATORY (INHALATION) at 07:09

## 2017-09-06 RX ADMIN — BENZONATATE 100 MG: 100 CAPSULE ORAL at 10:09

## 2017-09-06 RX ADMIN — IPRATROPIUM BROMIDE AND ALBUTEROL SULFATE 3 ML: .5; 3 SOLUTION RESPIRATORY (INHALATION) at 10:09

## 2017-09-06 RX ADMIN — DOXYCYCLINE HYCLATE 100 MG: 100 TABLET, COATED ORAL at 08:09

## 2017-09-06 RX ADMIN — IPRATROPIUM BROMIDE AND ALBUTEROL SULFATE 3 ML: .5; 3 SOLUTION RESPIRATORY (INHALATION) at 04:09

## 2017-09-06 RX ADMIN — MONTELUKAST SODIUM 10 MG: 10 TABLET, FILM COATED ORAL at 08:09

## 2017-09-06 RX ADMIN — IPRATROPIUM BROMIDE AND ALBUTEROL SULFATE 3 ML: .5; 3 SOLUTION RESPIRATORY (INHALATION) at 03:09

## 2017-09-06 NOTE — PLAN OF CARE
Problem: Patient Care Overview  Goal: Plan of Care Review  Outcome: Ongoing (interventions implemented as appropriate)  Plan of care reviewed with patient. Pt VSS. Call light and belongings left within reach. Patient encouraged to turn Q2. Patient remained off BiPAP throughout the day (orders from Dr. Nicole). Patient fall risk reviewed. She has no c/o of pain. Will continue to monitor.

## 2017-09-06 NOTE — PROGRESS NOTES
[facility] Hospital Medicine  Progress Note    Patient Name: Talia Mueller  MRN: [id number]  Patient Class: IP- Inpatient   Admission Date: 9/4/2017  Length of Stay: 1 days  Attending Physician: Killian Valdivia MD  Primary Care Provider: Camilla Lomas MD        Subjective:     Principal Problem:COPD exacerbation    Interval History: feels breathing much better. Cough is her biggest problem. Tolerating biPAP.  Results of outside sleep study obtained - needs CPAP.    Review of Systems   Constitutional: Negative for chills and fever.   Respiratory: Positive for cough. Negative for shortness of breath.    Cardiovascular: Negative for chest pain and palpitations.     Objective:     Vital Signs (Most Recent):  Temp: 98.6 °F (37 °C) (09/06/17 0723)  Pulse: 69 (09/06/17 0826)  Resp: (!) 28 (09/06/17 0826)  BP: (!) 139/90 (09/06/17 0723)  SpO2: 100 % (09/06/17 0826) Vital Signs (24h Range):  Temp:  [97 °F (36.1 °C)-98.6 °F (37 °C)] 98.6 °F (37 °C)  Pulse:  [] 69  Resp:  [14-28] 28  SpO2:  [95 %-100 %] 100 %  BP: (139-167)/(73-93) 139/90     Weight: 107.1 kg (236 lb 1.8 oz)  Body mass index is 38.11 kg/m².    Intake/Output Summary (Last 24 hours) at 09/06/17 0907  Last data filed at 09/06/17 0500   Gross per 24 hour   Intake              700 ml   Output                5 ml   Net              695 ml      Physical Exam   Cardiovascular: Normal rate, regular rhythm and normal heart sounds.    Pulmonary/Chest: Effort normal. No respiratory distress. She has wheezes (( few)).   Abdominal: Soft. Bowel sounds are normal. She exhibits no distension.   Musculoskeletal: She exhibits no edema.       Significant Labs: All pertinent labs within the past 24 hours have been reviewed.    Significant Imaging: I have reviewed all pertinent imaging results/findings within the past 24 hours.    Assessment/Plan:      * COPD exacerbation    Improved  Bipap 10/5 at night  Prednisone 60 mg x 5 days  Duonebs Q4  PRN  Continue home Breo, Singulair, and Spiriva  Doxycycline 100mg daily        Morbid obesity    Discussed importance of weight loss to help with GEOVANNI          GEOVANNI (obstructive sleep apnea)    Outside sleep study reviewed.  Will arrange for CPAP upon discharge.  Pt will need to follow up for titration study          Productive cough    WBC-6.95  Doxycycline 100mg daily  -Will monitor          Acute on chronic respiratory failure    clinically improving with BIPAP.  Will observe her off of bipap during the day today            VTE Risk Mitigation         Ordered     enoxaparin injection 40 mg  Daily     Route:  Subcutaneous        09/05/17 0533     Place sequential compression device  Until discontinued      09/05/17 0533     Medium Risk of VTE  Once      09/05/17 0533     Place GUSTAVO hose  Until discontinued      09/05/17 0533              Killian Valdivia MD  Department of Hospital Medicine   [facility]

## 2017-09-06 NOTE — ASSESSMENT & PLAN NOTE
Improved  Bipap 10/5 at night  Prednisone 60 mg x 5 days  Duonebs Q4 PRN  Continue home Breo, Singulair, and Spiriva  Doxycycline 100mg daily

## 2017-09-06 NOTE — ASSESSMENT & PLAN NOTE
Outside sleep study reviewed.  Will arrange for CPAP upon discharge.  Pt will need to follow up for titration study

## 2017-09-06 NOTE — SUBJECTIVE & OBJECTIVE
Interval History: feels breathing much better. Cough is her biggest problem. Tolerating biPAP.  Results of outside sleep study obtained - needs CPAP.    Review of Systems   Constitutional: Negative for chills and fever.   Respiratory: Positive for cough. Negative for shortness of breath.    Cardiovascular: Negative for chest pain and palpitations.     Objective:     Vital Signs (Most Recent):  Temp: 98.6 °F (37 °C) (09/06/17 0723)  Pulse: 69 (09/06/17 0826)  Resp: (!) 28 (09/06/17 0826)  BP: (!) 139/90 (09/06/17 0723)  SpO2: 100 % (09/06/17 0826) Vital Signs (24h Range):  Temp:  [97 °F (36.1 °C)-98.6 °F (37 °C)] 98.6 °F (37 °C)  Pulse:  [] 69  Resp:  [14-28] 28  SpO2:  [95 %-100 %] 100 %  BP: (139-167)/(73-93) 139/90     Weight: 107.1 kg (236 lb 1.8 oz)  Body mass index is 38.11 kg/m².    Intake/Output Summary (Last 24 hours) at 09/06/17 0907  Last data filed at 09/06/17 0500   Gross per 24 hour   Intake              700 ml   Output                5 ml   Net              695 ml      Physical Exam   Cardiovascular: Normal rate, regular rhythm and normal heart sounds.    Pulmonary/Chest: Effort normal. No respiratory distress. She has wheezes (( few)).   Abdominal: Soft. Bowel sounds are normal. She exhibits no distension.   Musculoskeletal: She exhibits no edema.       Significant Labs: All pertinent labs within the past 24 hours have been reviewed.    Significant Imaging: I have reviewed all pertinent imaging results/findings within the past 24 hours.

## 2017-09-06 NOTE — PLAN OF CARE
CM sent order for C-Pap to [facility] and was informed that pt could not get unit until approved by her Medicaid plan. [facility] did send order to Medicaid. CM informed by Suzanne Mccollum,  for [facility], that pt's O2 sats did not qualify her for home O2 at this time. Pt likely will need O2 walk to determine oxygen needs. CM to follow up with plans.

## 2017-09-06 NOTE — PLAN OF CARE
Patient resting with no needs expressed at this time. Antiemetics given effectively x1 for vomiting. Patient ambulating to bathroom independently. VSS. Telemetry in place with alarms on and audible. Safety measures maintained. Patient instructed to use call light for assistance. Will continue to monitor..

## 2017-09-07 PROBLEM — E83.39 HYPOPHOSPHATEMIA: Status: ACTIVE | Noted: 2017-09-07

## 2017-09-07 LAB
ALBUMIN SERPL BCP-MCNC: 3.6 G/DL
ALP SERPL-CCNC: 71 U/L
ALT SERPL W/O P-5'-P-CCNC: 29 U/L
ANION GAP SERPL CALC-SCNC: 11 MMOL/L
AST SERPL-CCNC: 23 U/L
BASOPHILS # BLD AUTO: 0.01 K/UL
BASOPHILS NFR BLD: 0.1 %
BILIRUB SERPL-MCNC: 0.2 MG/DL
BUN SERPL-MCNC: 19 MG/DL
CALCIUM SERPL-MCNC: 9.7 MG/DL
CHLORIDE SERPL-SCNC: 108 MMOL/L
CO2 SERPL-SCNC: 22 MMOL/L
CREAT SERPL-MCNC: 0.8 MG/DL
DIFFERENTIAL METHOD: ABNORMAL
EOSINOPHIL # BLD AUTO: 0 K/UL
EOSINOPHIL NFR BLD: 0.1 %
ERYTHROCYTE [DISTWIDTH] IN BLOOD BY AUTOMATED COUNT: 15.5 %
EST. GFR  (AFRICAN AMERICAN): >60 ML/MIN/1.73 M^2
EST. GFR  (NON AFRICAN AMERICAN): >60 ML/MIN/1.73 M^2
GLUCOSE SERPL-MCNC: 122 MG/DL
HCT VFR BLD AUTO: 34.2 %
HGB BLD-MCNC: 10.8 G/DL
LYMPHOCYTES # BLD AUTO: 2.7 K/UL
LYMPHOCYTES NFR BLD: 20.3 %
MAGNESIUM SERPL-MCNC: 1.8 MG/DL
MCH RBC QN AUTO: 24.8 PG
MCHC RBC AUTO-ENTMCNC: 31.6 G/DL
MCV RBC AUTO: 78 FL
MONOCYTES # BLD AUTO: 1 K/UL
MONOCYTES NFR BLD: 7.3 %
NEUTROPHILS # BLD AUTO: 9.5 K/UL
NEUTROPHILS NFR BLD: 71.1 %
PHOSPHATE SERPL-MCNC: 2.6 MG/DL
PLATELET # BLD AUTO: 367 K/UL
PMV BLD AUTO: 10.2 FL
POTASSIUM SERPL-SCNC: 3.8 MMOL/L
PROT SERPL-MCNC: 7 G/DL
RBC # BLD AUTO: 4.36 M/UL
SODIUM SERPL-SCNC: 141 MMOL/L
WBC # BLD AUTO: 13.38 K/UL

## 2017-09-07 PROCEDURE — 11000001 HC ACUTE MED/SURG PRIVATE ROOM

## 2017-09-07 PROCEDURE — 83735 ASSAY OF MAGNESIUM: CPT

## 2017-09-07 PROCEDURE — 99233 SBSQ HOSP IP/OBS HIGH 50: CPT | Mod: ,,, | Performed by: INTERNAL MEDICINE

## 2017-09-07 PROCEDURE — 25000003 PHARM REV CODE 250: Performed by: NURSE PRACTITIONER

## 2017-09-07 PROCEDURE — 99900035 HC TECH TIME PER 15 MIN (STAT)

## 2017-09-07 PROCEDURE — 97162 PT EVAL MOD COMPLEX 30 MIN: CPT

## 2017-09-07 PROCEDURE — 25000242 PHARM REV CODE 250 ALT 637 W/ HCPCS: Performed by: NURSE PRACTITIONER

## 2017-09-07 PROCEDURE — 63600175 PHARM REV CODE 636 W HCPCS: Performed by: NURSE PRACTITIONER

## 2017-09-07 PROCEDURE — 94640 AIRWAY INHALATION TREATMENT: CPT

## 2017-09-07 PROCEDURE — 94660 CPAP INITIATION&MGMT: CPT

## 2017-09-07 PROCEDURE — 80053 COMPREHEN METABOLIC PANEL: CPT

## 2017-09-07 PROCEDURE — 84100 ASSAY OF PHOSPHORUS: CPT

## 2017-09-07 PROCEDURE — 94761 N-INVAS EAR/PLS OXIMETRY MLT: CPT

## 2017-09-07 PROCEDURE — 36415 COLL VENOUS BLD VENIPUNCTURE: CPT

## 2017-09-07 PROCEDURE — 85025 COMPLETE CBC W/AUTO DIFF WBC: CPT

## 2017-09-07 PROCEDURE — 25000003 PHARM REV CODE 250: Performed by: INTERNAL MEDICINE

## 2017-09-07 RX ORDER — PROMETHAZINE HYDROCHLORIDE AND CODEINE PHOSPHATE 6.25; 1 MG/5ML; MG/5ML
5 SOLUTION ORAL ONCE
Status: COMPLETED | OUTPATIENT
Start: 2017-09-07 | End: 2017-09-07

## 2017-09-07 RX ORDER — PROMETHAZINE HYDROCHLORIDE AND CODEINE PHOSPHATE 6.25; 1 MG/5ML; MG/5ML
5 SOLUTION ORAL EVERY 4 HOURS PRN
Status: DISCONTINUED | OUTPATIENT
Start: 2017-09-07 | End: 2017-09-08 | Stop reason: HOSPADM

## 2017-09-07 RX ADMIN — SUCRALFATE 1 G: 1 TABLET ORAL at 05:09

## 2017-09-07 RX ADMIN — BENZONATATE 100 MG: 100 CAPSULE ORAL at 02:09

## 2017-09-07 RX ADMIN — ENOXAPARIN SODIUM 40 MG: 100 INJECTION SUBCUTANEOUS at 04:09

## 2017-09-07 RX ADMIN — GABAPENTIN 100 MG: 100 CAPSULE ORAL at 05:09

## 2017-09-07 RX ADMIN — IPRATROPIUM BROMIDE AND ALBUTEROL SULFATE 3 ML: .5; 3 SOLUTION RESPIRATORY (INHALATION) at 03:09

## 2017-09-07 RX ADMIN — BENZONATATE 100 MG: 100 CAPSULE ORAL at 08:09

## 2017-09-07 RX ADMIN — PROMETHAZINE HYDROCHLORIDE AND CODEINE PHOSPHATE 5 ML: 6.25; 1 SYRUP ORAL at 02:09

## 2017-09-07 RX ADMIN — ONDANSETRON 4 MG: 2 INJECTION INTRAMUSCULAR; INTRAVENOUS at 03:09

## 2017-09-07 RX ADMIN — ONDANSETRON 4 MG: 2 INJECTION INTRAMUSCULAR; INTRAVENOUS at 11:09

## 2017-09-07 RX ADMIN — SUCRALFATE 1 G: 1 TABLET ORAL at 11:09

## 2017-09-07 RX ADMIN — DOXYCYCLINE HYCLATE 100 MG: 100 TABLET, COATED ORAL at 10:09

## 2017-09-07 RX ADMIN — PROMETHAZINE HYDROCHLORIDE AND CODEINE PHOSPHATE 5 ML: 6.25; 1 SYRUP ORAL at 10:09

## 2017-09-07 RX ADMIN — GABAPENTIN 100 MG: 100 CAPSULE ORAL at 08:09

## 2017-09-07 RX ADMIN — IPRATROPIUM BROMIDE AND ALBUTEROL SULFATE 3 ML: .5; 3 SOLUTION RESPIRATORY (INHALATION) at 07:09

## 2017-09-07 RX ADMIN — CLONAZEPAM 0.5 MG: 0.5 TABLET ORAL at 10:09

## 2017-09-07 RX ADMIN — GABAPENTIN 100 MG: 100 CAPSULE ORAL at 02:09

## 2017-09-07 RX ADMIN — PROMETHAZINE HYDROCHLORIDE AND CODEINE PHOSPHATE 5 ML: 6.25; 1 SYRUP ORAL at 08:09

## 2017-09-07 RX ADMIN — IPRATROPIUM BROMIDE AND ALBUTEROL SULFATE 3 ML: .5; 3 SOLUTION RESPIRATORY (INHALATION) at 12:09

## 2017-09-07 RX ADMIN — FAMOTIDINE 20 MG: 20 TABLET, FILM COATED ORAL at 10:09

## 2017-09-07 RX ADMIN — FAMOTIDINE 20 MG: 20 TABLET, FILM COATED ORAL at 08:09

## 2017-09-07 RX ADMIN — CLONAZEPAM 0.5 MG: 0.5 TABLET ORAL at 08:09

## 2017-09-07 RX ADMIN — TIOTROPIUM BROMIDE 18 MCG: 18 CAPSULE ORAL; RESPIRATORY (INHALATION) at 07:09

## 2017-09-07 RX ADMIN — ACETAMINOPHEN 650 MG: 325 TABLET ORAL at 12:09

## 2017-09-07 RX ADMIN — SUCRALFATE 1 G: 1 TABLET ORAL at 12:09

## 2017-09-07 RX ADMIN — PREDNISONE 60 MG: 20 TABLET ORAL at 10:09

## 2017-09-07 RX ADMIN — BENZONATATE 100 MG: 100 CAPSULE ORAL at 05:09

## 2017-09-07 RX ADMIN — MONTELUKAST SODIUM 10 MG: 10 TABLET, FILM COATED ORAL at 08:09

## 2017-09-07 RX ADMIN — ATORVASTATIN CALCIUM 40 MG: 20 TABLET, FILM COATED ORAL at 10:09

## 2017-09-07 RX ADMIN — FLUTICASONE FUROATE AND VILANTEROL TRIFENATATE 1 PUFF: 100; 25 POWDER RESPIRATORY (INHALATION) at 07:09

## 2017-09-07 RX ADMIN — IPRATROPIUM BROMIDE AND ALBUTEROL SULFATE 3 ML: .5; 3 SOLUTION RESPIRATORY (INHALATION) at 11:09

## 2017-09-07 NOTE — PT/OT/SLP EVAL
"Physical Therapy  Evaluation    Talia Mueller   MRN: [id number]   Admitting Diagnosis: COPD exacerbation    PT Received On: 09/07/17  PT Start Time: 1450     PT Stop Time: 1504    PT Total Time (min): 14 min       Billable Minutes:  Evaluation 14    Diagnosis: COPD exacerbation    Past Medical History:   Diagnosis Date    Anxiety     Asthma     Bronchitis     COPD (chronic obstructive pulmonary disease)     Depression     GERD (gastroesophageal reflux disease)     Hypertension     Schizophrenia       Past Surgical History:   Procedure Laterality Date    HERNIA REPAIR      none       Referring physician: Shea  Date referred to PT: 9/7/2017    General Precautions: Standard, fall  Orthopedic Precautions: N/A     Do you have any cultural, spiritual, Islam conflicts, given your current situation?: None specified    Patient History:  Living Environment Comment: Pt lives alone in a 1 story house wtih 3 JOSELYN and no handrails. She reports she takes the bus for transportation. She is (I) with short household mobility but has poor endurance. She denies having family or friend assist upon discharge, but reports her sister lives close by with further inquiry.   Equipment Currently Used at Home: none (Pt reports she lost her straight cane and RW)  DME owned (not currently used): none    Previous Level of Function:  Ambulation Skills: independent  Transfer Skills: independent  ADL Skills: independent  Work/Leisure Activity: independent    Subjective:  Communicated with RN prior to session.  "I've been falling all over the place like a fool" pt reported when asked how she was doing   Chief Complaint: Wheezing and SOB  Patient goals: To return home    Pain/Comfort  Pain Rating 1: 0/10  Pain Rating Post-Intervention 1: 0/10    Objective:   Patient found with: peripheral IV     Cognitive Exam:  Oriented to: Person, Place, Time and Situation    Follows Commands/attention: Follows one-step commands  Safety " awareness/insight to disability: intact    Physical Exam:  Postural examination/scapula alignment: Rounded shoulder and Head forward    Skin integrity: Visible skin intact  Edema: None noted     Sensation:   Intact    Lower Extremity Range of Motion:  Right Lower Extremity: WFL  Left Lower Extremity: WFL    Lower Extremity Strength:  Right Lower Extremity: WFL  Left Lower Extremity: WFL     Gross motor coordination: WFL    Functional Mobility:  Bed Mobility:  Supine to Sit: Supervision  Sit to Supine: Supervision    Transfers:  Sit <> Stand Assistance: Contact Guard Assistance  Sit <> Stand Assistive Device: No Assistive Device    Gait:   Gait Distance: x 150' with standing rest break for recovery at ~ 75'   Assistance 1: Contact Guard Assistance  Gait Assistive Device: No device  Gait Pattern: reciprocal  Gait Deviation(s): decreased al, decreased velocity of limb motion    Balance:   Static Sit: GOOD: Takes MODERATE challenges from all directions  Dynamic Sit: GOOD: Maintains balance through MODERATE excursions of active trunk movement  Static Stand: FAIR: Maintains without assist but unable to take challenges  Dynamic stand: FAIR: Needs CONTACT GUARD during gait    AM-PAC 6 CLICK MOBILITY  How much help from another person does this patient currently need?   1 = Unable, Total/Dependent Assistance  2 = A lot, Maximum/Moderate Assistance  3 = A little, Minimum/Contact Guard/Supervision  4 = None, Modified [place]/Independent    Turning over in bed (including adjusting bedclothes, sheets and blankets)?: 4  Sitting down on and standing up from a chair with arms (e.g., wheelchair, bedside commode, etc.): 3  Moving from lying on back to sitting on the side of the bed?: 3  Moving to and from a bed to a chair (including a wheelchair)?: 3  Need to walk in hospital room?: 3  Climbing 3-5 steps with a railing?: 3  Total Score: 19     AM-PAC Raw Score CMS G-Code Modifier Level of Impairment Assistance   6 %  Total / Unable   7 - 9 CM 80 - 100% Maximal Assist   10 - 14 CL 60 - 80% Moderate Assist   15 - 19 CK 40 - 60% Moderate Assist   20 - 22 CJ 20 - 40% Minimal Assist   23 CI 1-20% SBA / CGA   24 CH 0% Independent/ Mod I     Patient left supine with all lines intact, call button in reach and RN notified.    Assessment:   Talia Muelelr is a 52 y.o. female with a medical diagnosis of COPD exacerbation and presents with mild SOB and wheezing with activity. Pt endorses a history of falls, although no LOB noted this session. Spo2 remains 93-97% with activity. Verbal cues for pacing. Pt would benefit from continued skilled PT to maximize independence and safety with functional mobility.    Rehab identified problem list/impairments: Rehab identified problem list/impairments: weakness, impaired endurance, impaired cardiopulmonary response to activity    Rehab potential is good.    Activity tolerance: Good    Discharge recommendations: Discharge Facility/Level Of Care Needs: home     Barriers to discharge: Barriers to Discharge: Decreased caregiver support    Equipment recommendations: Equipment Needed After Discharge: none     GOALS:    Physical Therapy Goals        Problem: Physical Therapy Goal    Goal Priority Disciplines Outcome Goal Variances Interventions   Physical Therapy Goal     PT/OT, PT Ongoing (interventions implemented as appropriate)     Description:  Goals to be met by: 17     Patient will increase functional independence with mobility by performin. Sit<>stand transfer with supervision u   2. Gait > 150 feet with SBA without AD  3. Ascend/descend 3 stairs with no handrails with CGA with or without AD.                  PLAN:    Patient to be seen 3 x/week to address the above listed problems via gait training, therapeutic activities, therapeutic exercises, neuromuscular re-education  Plan of Care expires: 10/07/17  Plan of Care reviewed with: patient    Babs Alvarenga, PT  2017

## 2017-09-07 NOTE — PLAN OF CARE
Patient currently resting without complaints of pain. Pt reported nausea which was fully relieved with 1x dose PRN zofran. Pt ambulating to BR independently. Pt on BiPAP, removing and replacing mask per self during activity. Safety measures maintained. Pt using call light for assistance. Will continue to monitor.

## 2017-09-07 NOTE — PHYSICIAN QUERY
PT Name: Talia Mueller  MR #: [id number]     Physician Query Form - Documentation Clarification      CDS/: Gloria Hutson               Contact information:[email or web address]    This form is a permanent document in the medical record.     Query Date: September 7, 2017    By submitting this query, we are merely seeking further clarification of documentation. Please utilize your independent clinical judgment when addressing the question(s) below.    The Medical record reflects the following:    Supporting Clinical Findings Location in Medical Record     Still very wheezy with transient hypoxia    PH = 7.309  PCO2 = 31.6  PO2 = 67  HCO3 = 15.9     ED Provider Note 9/4        ABGs 9/5     Acute on chronic respiratory failure          HM PN 9/7                                                                            Doctor, Please specify diagnosis or diagnoses associated with above clinical findings.    Provider Use Only      [  xx ]  Acute on chronic respiratory failure with hypoxia    [   ]  Acute on chronic respiratory failure, unspecified    [   ] Other explanations with details____________________________________                                                                                                               [  ] Clinically undetermined

## 2017-09-07 NOTE — PLAN OF CARE
Problem: Patient Care Overview  Goal: Plan of Care Review  Outcome: Ongoing (interventions implemented as appropriate)  No changes at this time.  Will continue to monitor.

## 2017-09-07 NOTE — PLAN OF CARE
Problem: Physical Therapy Goal  Goal: Physical Therapy Goal  Goals to be met by: 17     Patient will increase functional independence with mobility by performin. Sit<>stand transfer with supervision u   2. Gait > 150 feet with SBA without AD  3. Ascend/descend 3 stairs with no handrails with CGA with or without AD.  Outcome: Ongoing (interventions implemented as appropriate)  PT evaluation completed. Please see progress note for details, POC, and recommendations.

## 2017-09-07 NOTE — SUBJECTIVE & OBJECTIVE
Interval History: having persistent coughing unrelieved with tessalon. Cough is nonproductive.    Review of Systems   Constitutional: Negative for chills and fever.   Respiratory: Positive for cough. Negative for shortness of breath.    Cardiovascular: Negative for chest pain and palpitations.     Objective:     Vital Signs (Most Recent):  Temp: 97.6 °F (36.4 °C) (09/07/17 0845)  Pulse: 89 (09/07/17 0845)  Resp: 20 (09/07/17 0845)  BP: 135/81 (09/07/17 0845)  SpO2: 97 % (09/07/17 0845) Vital Signs (24h Range):  Temp:  [97.5 °F (36.4 °C)-97.9 °F (36.6 °C)] 97.6 °F (36.4 °C)  Pulse:  [] 89  Resp:  [16-20] 20  SpO2:  [94 %-98 %] 97 %  BP: (126-167)/(75-84) 135/81     Weight: 109.9 kg (242 lb 4.6 oz)  Body mass index is 39.11 kg/m².    Intake/Output Summary (Last 24 hours) at 09/07/17 0947  Last data filed at 09/07/17 0300   Gross per 24 hour   Intake             1580 ml   Output                0 ml   Net             1580 ml      Physical Exam   Cardiovascular: Normal rate, regular rhythm and normal heart sounds.    Pulmonary/Chest: Effort normal. No respiratory distress. She has wheezes (( few)).   Abdominal: Soft. Bowel sounds are normal. She exhibits no distension.   Musculoskeletal: She exhibits no edema.       Significant Labs: All pertinent labs within the past 24 hours have been reviewed.    Significant Imaging: I have reviewed all pertinent imaging results/findings within the past 24 hours.

## 2017-09-07 NOTE — PROGRESS NOTES
[facility] Hospital Medicine  Progress Note    Patient Name: Talia Mueller  MRN: [id number]  Patient Class: IP- Inpatient   Admission Date: 9/4/2017  Length of Stay: 2 days  Attending Physician: Killian Valdivia MD  Primary Care Provider: Camilla Lomas MD        Subjective:     Principal Problem:COPD exacerbation      Interval History: having persistent coughing unrelieved with tessalon. Cough is nonproductive.    Review of Systems   Constitutional: Negative for chills and fever.   Respiratory: Positive for cough. Negative for shortness of breath.    Cardiovascular: Negative for chest pain and palpitations.     Objective:     Vital Signs (Most Recent):  Temp: 97.6 °F (36.4 °C) (09/07/17 0845)  Pulse: 89 (09/07/17 0845)  Resp: 20 (09/07/17 0845)  BP: 135/81 (09/07/17 0845)  SpO2: 97 % (09/07/17 0845) Vital Signs (24h Range):  Temp:  [97.5 °F (36.4 °C)-97.9 °F (36.6 °C)] 97.6 °F (36.4 °C)  Pulse:  [] 89  Resp:  [16-20] 20  SpO2:  [94 %-98 %] 97 %  BP: (126-167)/(75-84) 135/81     Weight: 109.9 kg (242 lb 4.6 oz)  Body mass index is 39.11 kg/m².    Intake/Output Summary (Last 24 hours) at 09/07/17 0947  Last data filed at 09/07/17 0300   Gross per 24 hour   Intake             1580 ml   Output                0 ml   Net             1580 ml      Physical Exam   Cardiovascular: Normal rate, regular rhythm and normal heart sounds.    Pulmonary/Chest: Effort normal. No respiratory distress. She has wheezes (( few)).   Abdominal: Soft. Bowel sounds are normal. She exhibits no distension.   Musculoskeletal: She exhibits no edema.       Significant Labs: All pertinent labs within the past 24 hours have been reviewed.    Significant Imaging: I have reviewed all pertinent imaging results/findings within the past 24 hours.    Assessment/Plan:      * COPD exacerbation    Improved  Bipap 10/5 at night  Prednisone 60 mg x 5 days  Duonebs Q4 PRN  Continue home Breo, Singulair, and  Spiriva  Doxycycline 100mg daily  Add codiene for cough        Hypophosphatemia    Replace  Advised pt to drink a coke today          Morbid obesity    Discussed importance of weight loss to help with GEOVANNI          GEOVANNI (obstructive sleep apnea)    Outside sleep study reviewed.  Will arrange for CPAP upon discharge.  Pt will need to follow up for titration study          Productive cough    WBC-6.95  Doxycycline 100mg daily  -Will monitor          Acute on chronic respiratory failure    clinically improving with BIPAP.  Continue observe her off of bipap during the day today.   Use nightly            VTE Risk Mitigation         Ordered     enoxaparin injection 40 mg  Daily     Route:  Subcutaneous        09/05/17 0533     Place sequential compression device  Until discontinued      09/05/17 0533     Medium Risk of VTE  Once      09/05/17 0533     Place GUSTAVO hose  Until discontinued      09/05/17 0533              Killian Valdivia MD  Department of Hospital Medicine   [facility]

## 2017-09-07 NOTE — PLAN OF CARE
Problem: Patient Care Overview  Goal: Plan of Care Review  Outcome: Outcome(s) achieved Date Met: 09/07/17  Pt remains on RA. Neb tx and MDI given and tolerated well. BiPAP at bedside for QHS.

## 2017-09-07 NOTE — ASSESSMENT & PLAN NOTE
clinically improving with BIPAP.  Continue observe her off of bipap during the day today.   Use nightly

## 2017-09-07 NOTE — ASSESSMENT & PLAN NOTE
Improved  Bipap 10/5 at night  Prednisone 60 mg x 5 days  Duonebs Q4 PRN  Continue home Breo, Singulair, and Spiriva  Doxycycline 100mg daily  Add codiene for cough

## 2017-09-08 VITALS
HEART RATE: 77 BPM | DIASTOLIC BLOOD PRESSURE: 76 MMHG | RESPIRATION RATE: 20 BRPM | TEMPERATURE: 99 F | BODY MASS INDEX: 39.25 KG/M2 | SYSTOLIC BLOOD PRESSURE: 158 MMHG | OXYGEN SATURATION: 95 % | HEIGHT: 66 IN | WEIGHT: 244.25 LBS

## 2017-09-08 PROBLEM — D64.9 ANEMIA: Status: ACTIVE | Noted: 2017-09-08

## 2017-09-08 PROBLEM — E83.39 HYPOPHOSPHATEMIA: Status: RESOLVED | Noted: 2017-09-07 | Resolved: 2017-09-08

## 2017-09-08 LAB
ALBUMIN SERPL BCP-MCNC: 3.7 G/DL
ALP SERPL-CCNC: 74 U/L
ALT SERPL W/O P-5'-P-CCNC: 35 U/L
ANION GAP SERPL CALC-SCNC: 12 MMOL/L
AST SERPL-CCNC: 24 U/L
BASOPHILS # BLD AUTO: 0.01 K/UL
BASOPHILS NFR BLD: 0.1 %
BILIRUB SERPL-MCNC: 0.2 MG/DL
BUN SERPL-MCNC: 16 MG/DL
CALCIUM SERPL-MCNC: 9.8 MG/DL
CHLORIDE SERPL-SCNC: 107 MMOL/L
CO2 SERPL-SCNC: 20 MMOL/L
CREAT SERPL-MCNC: 0.8 MG/DL
DIFFERENTIAL METHOD: ABNORMAL
EOSINOPHIL # BLD AUTO: 0 K/UL
EOSINOPHIL NFR BLD: 0.1 %
ERYTHROCYTE [DISTWIDTH] IN BLOOD BY AUTOMATED COUNT: 15.4 %
EST. GFR  (AFRICAN AMERICAN): >60 ML/MIN/1.73 M^2
EST. GFR  (NON AFRICAN AMERICAN): >60 ML/MIN/1.73 M^2
GLUCOSE SERPL-MCNC: 162 MG/DL
HCT VFR BLD AUTO: 33.9 %
HGB BLD-MCNC: 11 G/DL
LYMPHOCYTES # BLD AUTO: 2 K/UL
LYMPHOCYTES NFR BLD: 14.4 %
MAGNESIUM SERPL-MCNC: 2 MG/DL
MCH RBC QN AUTO: 25.4 PG
MCHC RBC AUTO-ENTMCNC: 32.4 G/DL
MCV RBC AUTO: 78 FL
MONOCYTES # BLD AUTO: 0.9 K/UL
MONOCYTES NFR BLD: 6.1 %
NEUTROPHILS # BLD AUTO: 11 K/UL
NEUTROPHILS NFR BLD: 77.4 %
PHOSPHATE SERPL-MCNC: 3 MG/DL
PLATELET # BLD AUTO: 339 K/UL
PMV BLD AUTO: 10.2 FL
POTASSIUM SERPL-SCNC: 4.3 MMOL/L
PROT SERPL-MCNC: 7.5 G/DL
RBC # BLD AUTO: 4.33 M/UL
SODIUM SERPL-SCNC: 139 MMOL/L
WBC # BLD AUTO: 14.16 K/UL

## 2017-09-08 PROCEDURE — 97110 THERAPEUTIC EXERCISES: CPT

## 2017-09-08 PROCEDURE — 36415 COLL VENOUS BLD VENIPUNCTURE: CPT

## 2017-09-08 PROCEDURE — 94640 AIRWAY INHALATION TREATMENT: CPT

## 2017-09-08 PROCEDURE — 99238 HOSP IP/OBS DSCHRG MGMT 30/<: CPT | Mod: ,,, | Performed by: INTERNAL MEDICINE

## 2017-09-08 PROCEDURE — 80053 COMPREHEN METABOLIC PANEL: CPT

## 2017-09-08 PROCEDURE — 85025 COMPLETE CBC W/AUTO DIFF WBC: CPT

## 2017-09-08 PROCEDURE — 97535 SELF CARE MNGMENT TRAINING: CPT

## 2017-09-08 PROCEDURE — 97165 OT EVAL LOW COMPLEX 30 MIN: CPT

## 2017-09-08 PROCEDURE — 97116 GAIT TRAINING THERAPY: CPT

## 2017-09-08 PROCEDURE — 84100 ASSAY OF PHOSPHORUS: CPT

## 2017-09-08 PROCEDURE — 27000221 HC OXYGEN, UP TO 24 HOURS

## 2017-09-08 PROCEDURE — 25000242 PHARM REV CODE 250 ALT 637 W/ HCPCS: Performed by: NURSE PRACTITIONER

## 2017-09-08 PROCEDURE — 25000003 PHARM REV CODE 250: Performed by: NURSE PRACTITIONER

## 2017-09-08 PROCEDURE — 63600175 PHARM REV CODE 636 W HCPCS: Performed by: NURSE PRACTITIONER

## 2017-09-08 PROCEDURE — 94761 N-INVAS EAR/PLS OXIMETRY MLT: CPT

## 2017-09-08 PROCEDURE — 25000003 PHARM REV CODE 250: Performed by: INTERNAL MEDICINE

## 2017-09-08 PROCEDURE — 83735 ASSAY OF MAGNESIUM: CPT

## 2017-09-08 RX ORDER — PROMETHAZINE HYDROCHLORIDE AND CODEINE PHOSPHATE 6.25; 1 MG/5ML; MG/5ML
5 SOLUTION ORAL EVERY 4 HOURS PRN
Qty: 75 ML | Refills: 0 | Status: SHIPPED | OUTPATIENT
Start: 2017-09-08 | End: 2017-09-18

## 2017-09-08 RX ORDER — PREDNISONE 20 MG/1
60 TABLET ORAL DAILY
Qty: 15 TABLET | Refills: 0 | Status: SHIPPED | OUTPATIENT
Start: 2017-09-09 | End: 2017-09-14

## 2017-09-08 RX ORDER — DOXYCYCLINE HYCLATE 100 MG
100 TABLET ORAL DAILY
Qty: 5 TABLET | Refills: 0 | Status: SHIPPED | OUTPATIENT
Start: 2017-09-09 | End: 2017-09-14

## 2017-09-08 RX ORDER — BENZONATATE 100 MG/1
100 CAPSULE ORAL 3 TIMES DAILY
Qty: 30 CAPSULE | Refills: 0 | Status: SHIPPED | OUTPATIENT
Start: 2017-09-08 | End: 2017-09-18

## 2017-09-08 RX ADMIN — IPRATROPIUM BROMIDE AND ALBUTEROL SULFATE 3 ML: .5; 3 SOLUTION RESPIRATORY (INHALATION) at 03:09

## 2017-09-08 RX ADMIN — CLONAZEPAM 0.5 MG: 0.5 TABLET ORAL at 08:09

## 2017-09-08 RX ADMIN — GABAPENTIN 100 MG: 100 CAPSULE ORAL at 05:09

## 2017-09-08 RX ADMIN — BENZONATATE 100 MG: 100 CAPSULE ORAL at 05:09

## 2017-09-08 RX ADMIN — ATORVASTATIN CALCIUM 40 MG: 20 TABLET, FILM COATED ORAL at 08:09

## 2017-09-08 RX ADMIN — IPRATROPIUM BROMIDE AND ALBUTEROL SULFATE 3 ML: .5; 3 SOLUTION RESPIRATORY (INHALATION) at 04:09

## 2017-09-08 RX ADMIN — DOXYCYCLINE HYCLATE 100 MG: 100 TABLET, COATED ORAL at 08:09

## 2017-09-08 RX ADMIN — IPRATROPIUM BROMIDE AND ALBUTEROL SULFATE 3 ML: .5; 3 SOLUTION RESPIRATORY (INHALATION) at 08:09

## 2017-09-08 RX ADMIN — FLUTICASONE FUROATE AND VILANTEROL TRIFENATATE 1 PUFF: 100; 25 POWDER RESPIRATORY (INHALATION) at 08:09

## 2017-09-08 RX ADMIN — TIOTROPIUM BROMIDE 18 MCG: 18 CAPSULE ORAL; RESPIRATORY (INHALATION) at 08:09

## 2017-09-08 RX ADMIN — FAMOTIDINE 20 MG: 20 TABLET, FILM COATED ORAL at 08:09

## 2017-09-08 RX ADMIN — ACETAMINOPHEN 650 MG: 325 TABLET ORAL at 08:09

## 2017-09-08 RX ADMIN — SUCRALFATE 1 G: 1 TABLET ORAL at 05:09

## 2017-09-08 RX ADMIN — BENZONATATE 100 MG: 100 CAPSULE ORAL at 01:09

## 2017-09-08 RX ADMIN — PREDNISONE 60 MG: 20 TABLET ORAL at 08:09

## 2017-09-08 RX ADMIN — PROMETHAZINE HYDROCHLORIDE AND CODEINE PHOSPHATE 5 ML: 6.25; 1 SYRUP ORAL at 08:09

## 2017-09-08 RX ADMIN — GABAPENTIN 100 MG: 100 CAPSULE ORAL at 01:09

## 2017-09-08 RX ADMIN — SUCRALFATE 1 G: 1 TABLET ORAL at 12:09

## 2017-09-08 RX ADMIN — IPRATROPIUM BROMIDE AND ALBUTEROL SULFATE 3 ML: .5; 3 SOLUTION RESPIRATORY (INHALATION) at 12:09

## 2017-09-08 NOTE — PLAN OF CARE
Problem: Patient Care Overview  Goal: Plan of Care Review  Outcome: Ongoing (interventions implemented as appropriate)  Pt resting in bed at this time, bed in lowest and locked position, call light and all other items within reach, night light on, non skid foot wear applied, instructed to call if needed, room walkway free of clutter and pt free of injury     BRP, pt calls when needed  N/V after resp treatment, prn zofran used with relief  Prn cough med used with moderate relief   SCDs refused, TEDs on, lovenox okay  SOB with activity  Bipap Hs, tolerating, sating in the upper 90s  Tele on reading NSR  VSS/ afebrile        No further complaints or concerns at this time  Will cont to monitor

## 2017-09-08 NOTE — DISCHARGE INSTRUCTIONS
Thank you for choosing [facility] as your Health Care Provider. [facility] strives to provide the best healthcare available to you. In the next few days you may receive a Survey, either by mail or email,  asking you to rate our care that was provided to you during your stay.  Please return the survey to us, as your feedback is important. We aim to meet your expectations of safe, quality health care.    From your [facility] Team.      Discharge Instructions: COPD  You have been diagnosed with chronic obstructive pulmonary disease (COPD). This is a name given to a group of diseases that limit the flow of air in and out of your lungs. This makes it harder to breathe. With COPD, you are also more likely to get lung infections. COPD includes chronic bronchitis and emphysema. COPD is most often caused by heavy, long-term cigarette smoking.  Home care  Quit smoking  · If you smoke, quit. It is the best thing you can do for your COPD and your overall health.  · Join a stop-smoking program. There are even telephone, text message, and Internet programs to help you quit.  · Ask your healthcare provider about medicines or other methods to help you quit.  · Ask family members to quit smoking as well.  · Don't allow people to smoke in your home, in your car, or when they are around you.  Protect yourself from infection  · Wash your hands often. Do your best to keep your hands away from your face. Most germs are spread from your hands to your mouth.  · Get a flu shot every year. Also ask your provider about pneumonia vaccines.  · Avoid crowds. It's especially important to do this in the winter when more people have colds and flu.  · To stay healthy, get enough sleep, exercise regularly, and eat a balanced diet. You should:  ¨ Get about 8 hours of sleep every night.  ¨ Try to exercise for at least 30 minutes on most days.  ¨ Have healthy foods including fruits and vegetables, 100% whole grains, lean  meats and fish, and low-fat dairy products. Try to stay away from foods high in fats and sugar.  Take your medicines  Take your medicines exactly as directed. Don't skip doses.  Manage your stress  Stress can make COPD worse. Use this stress management technique:  · Find a quiet place and sit or lie in a comfortable position.  · Close your eyes and perform breathing exercises for several minutes. Ask your provider about the best way to breathe.  Pulmonary rehabilitation  · Pulmonary rehab can help you feel better. These programs include exercise, breathing techniques, information about COPD, counseling, and help for smokers.  · Ask your provider or your local hospital about programs in your area.  When to call your healthcare provider  Call your provider immediately if you have any of the following:  · Shortness of breath, wheezing, or coughing  · Increased mucus  · Yellow, green, bloody, or smelly mucus  · Fever or chills  · Tightness in your chest that does not go away with rest or medicine  · An irregular heartbeat or a feeling that your heart is beating very fast  · Swollen ankles   Date Last Reviewed: 5/1/2016  © 0601-3891 [employer]. [address] [place] [address]. All rights reserved. This information is not intended as a substitute for professional medical care. Always follow your healthcare professional's instructions.

## 2017-09-08 NOTE — PROGRESS NOTES
[facility] Hospital Medicine  Progress Note    Patient Name: Talia Mueller  MRN: [id number]  Patient Class: IP- Inpatient   Admission Date: 9/4/2017  Length of Stay: 3 days  Attending Physician: Killian Valdivia MD  Primary Care Provider: Camilla Lomas MD        Subjective:     Principal Problem:COPD exacerbation    HPI:  This is a 52 y.o. Female, with history of asthma and COPD, who presents via EMS with complaint of shortness of breath that began two days ago. The patient's sister notified EMS after SOB progressively worsened. Per EMS, BP was 150/110. CPAP machine put in place en route. Pt denies chest pain. She denies use of home oxygen, and is compliant with medication. Pt was intubated one week ago at another facility.    Hospital Course:  No notes on file    Interval History: cough better. Feels at baseline from breathing standpoint    Review of Systems   Constitutional: Negative for chills and fever.   Respiratory: Positive for cough. Negative for shortness of breath and wheezing.    Cardiovascular: Negative for chest pain and palpitations.     Objective:     Vital Signs (Most Recent):  Temp: 97.5 °F (36.4 °C) (09/08/17 0754)  Pulse: (!) 56 (09/08/17 1000)  Resp: (!) 24 (09/08/17 0819)  BP: 136/80 (09/08/17 0754)  SpO2: 99 % (09/08/17 0819) Vital Signs (24h Range):  Temp:  [97.5 °F (36.4 °C)-98 °F (36.7 °C)] 97.5 °F (36.4 °C)  Pulse:  [] 56  Resp:  [14-24] 24  SpO2:  [94 %-99 %] 99 %  BP: (125-142)/(74-91) 136/80     Weight: 110.8 kg (244 lb 4.3 oz)  Body mass index is 39.43 kg/m².    Intake/Output Summary (Last 24 hours) at 09/08/17 1029  Last data filed at 09/08/17 0500   Gross per 24 hour   Intake              750 ml   Output                3 ml   Net              747 ml      Physical Exam   Cardiovascular: Normal rate, regular rhythm and normal heart sounds.    Pulmonary/Chest: Effort normal. No respiratory distress. She has no wheezes.   Abdominal: Soft. Bowel sounds  are normal. She exhibits no distension.   Musculoskeletal: She exhibits no edema.       Significant Labs: All pertinent labs within the past 24 hours have been reviewed.    Significant Imaging: I have reviewed all pertinent imaging results/findings within the past 24 hours.    Assessment/Plan:      * COPD exacerbation    Improved  Bipap 10/5 at night  Prednisone 60 mg x 5 days  Duonebs Q4 PRN  Continue home Breo, Singulair, and Spiriva  Doxycycline 100mg daily  Added codiene for cough        Anemia    -microcytic  -likely iron deficient  -f/u with PCP for eval          Morbid obesity    Discussed importance of weight loss to help with GEOVANNI          GEOVANNI (obstructive sleep apnea)    Outside sleep study reviewed.  Will try to arrange for CPAP upon discharge.  Pt will need to follow up for titration study          Productive cough    WBC-6.95  Doxycycline 100mg daily  -Will monitor          Acute on chronic respiratory failure    clinically improved with BIPAP.  Doing well off of bipap during the day          VTE Risk Mitigation         Ordered     enoxaparin injection 40 mg  Daily     Route:  Subcutaneous        09/05/17 0533     Place sequential compression device  Until discontinued      09/05/17 0533     Medium Risk of VTE  Once      09/05/17 0533     Place GUSTAVO hose  Until discontinued      09/05/17 0533          Dispo: home today  5 days prendisone  Phenergan with codiene for cough    Killian Valdivia MD  Department of Hospital Medicine   [facility]

## 2017-09-08 NOTE — ASSESSMENT & PLAN NOTE
Outside sleep study reviewed.  Will try to arrange for CPAP upon discharge.  Pt will need to follow up for titration study

## 2017-09-08 NOTE — PLAN OF CARE
CM awaiting return call from Dr. Camilla Lomas, pt's PCP, to discuss second part of sleep study. Cm to follow./

## 2017-09-08 NOTE — PT/OT/SLP PROGRESS
Physical Therapy  Treatment    Talia Mueller   MRN: [id number]   Admitting Diagnosis: COPD exacerbation    PT Received On: 09/08/17  PT Start Time: 1040     PT Stop Time: 1105    PT Total Time (min): 25 min       Billable Minutes:  Gait [id number] and Therapeutic Exercise 10    Treatment Type: Treatment  PT/PTA: PTA     PTA Visit Number: 1       General Precautions: Standard, fall  Orthopedic Precautions: N/A   Braces: N/A    Do you have any cultural, spiritual, Samaritan conflicts, given your current situation?: None specified    Subjective:  Communicated with ns prior to session.  Pt agreeable to tx., only c/o some fatigue     Pain/Comfort  Pain Rating 1: 0/10  Pain Rating Post-Intervention 1: 0/10    Objective:   Patient found with: peripheral IV    Functional Mobility:  Bed Mobility:   Supine to Sit: Supervision  Sit to Supine: Supervision    Transfers:  Sit <> Stand Assistance: Stand By Assistance  Sit <> Stand Assistive Device: No Assistive Device    Gait:   Gait Distance: 200' w/ SBA  Assistance 1: Stand by Assistance  Gait Assistive Device: No device  Gait Pattern: reciprocal  Gait Deviation(s): decreased al, increased stride width    Stairs:  Did not attempt, pt reports she will not have any issues entering home (3 steps)    Balance:   Static Sit: GOOD: Takes MODERATE challenges from all directions  Dynamic Sit: GOOD: Maintains balance through MODERATE excursions of active trunk movement  Static Stand: FAIR+: Takes MINIMAL challenges from all directions  Dynamic stand: FAIR+: Needs CLOSE SUPERVISION during gait and is able to right self with minor LOB     Therapeutic Activities and Exercises:  Pt perf'd seated LE ex's of heel/toe raises, hip flex, LAQ's, UE ex's of shdlr flex, rows x 10 ea.     AM-PAC 6 CLICK MOBILITY  How much help from another person does this patient currently need?   1 = Unable, Total/Dependent Assistance  2 = A lot, Maximum/Moderate Assistance  3 = A little, Minimum/Contact  Guard/Supervision  4 = None, Modified [place]/Independent    Turning over in bed (including adjusting bedclothes, sheets and blankets)?: 4  Sitting down on and standing up from a chair with arms (e.g., wheelchair, bedside commode, etc.): 3  Moving from lying on back to sitting on the side of the bed?: 3  Moving to and from a bed to a chair (including a wheelchair)?: 3  Need to walk in hospital room?: 3  Climbing 3-5 steps with a railing?: 3  Total Score: 19    AM-PAC Raw Score CMS G-Code Modifier Level of Impairment Assistance   6 % Total / Unable   7 - 9 CM 80 - 100% Maximal Assist   10 - 14 CL 60 - 80% Moderate Assist   15 - 19 CK 40 - 60% Moderate Assist   20 - 22 CJ 20 - 40% Minimal Assist   23 CI 1-20% SBA / CGA   24 CH 0% Independent/ Mod I     Patient left HOB elevated with all lines intact and call button in reach.    Assessment:  Talia Mueller is a 52 y.o. female with a medical diagnosis of COPD exacerbation and presents with good ambulation today, NO LOB noted, though mild SOB noted, 97% on RA following amb..    Rehab identified problem list/impairments: Rehab identified problem list/impairments: weakness, impaired endurance, decreased safety awareness    Rehab potential is good.    Activity tolerance: Good    Discharge recommendations: Discharge Facility/Level Of Care Needs: home     Barriers to discharge: Barriers to Discharge: Decreased caregiver support    Equipment recommendations: Equipment Needed After Discharge: none     GOALS:    Physical Therapy Goals        Problem: Physical Therapy Goal    Goal Priority Disciplines Outcome Goal Variances Interventions   Physical Therapy Goal     PT/OT, PT Ongoing (interventions implemented as appropriate)     Description:  Goals to be met by: 17     Patient will increase functional independence with mobility by performin. Sit<>stand transfer with supervision u   2. Gait > 150 feet with SBA without AD  3. Ascend/descend 3 stairs  with no handrails with CGA with or without AD.                    PLAN:    Patient to be seen 3 x/week  to address the above listed problems via gait training, therapeutic activities, therapeutic exercises, neuromuscular re-education  Plan of Care expires: 10/07/17  Plan of Care reviewed with: patient         Reny St, PTA  09/08/2017

## 2017-09-08 NOTE — PLAN OF CARE
Problem: Occupational Therapy Goal  Goal: Occupational Therapy Goal  Outcome: Outcome(s) achieved Date Met: 09/08/17  Pt educated on pursed lip breathing and demonstrated understanding. Pt completed ADL's with MOD I - SBA and no LOB around hospital room. Pt educated on importance of keeping DME in home and use of bath bench and or shower chair when bathing for increased safety. Pt no longer requires acute OT services at this time. Recommending return home with home health OT, straight cane and bath bench/shower chair pending pt preference

## 2017-09-08 NOTE — NURSING
Discharge instructions reviewed with patient, all questions answered and understanding verbalized.  All medications reviewed with patient, reviewed both new meds and paying extra attention to medications for COPD.  Pt stated she often forgets when she runs out of medications so I encouraged her to keep a calender and write refill dates.  Pt reports she still smokes occasionally.  Pt strongly encouraged to stop smoking and to encourage family/friends to stop as well.  Demonstrated pursed lip breathing to patient with successful RD by pt.  Healthy eating and weight management encouraged.  Pt actively engaged during education.  IVs removed and catheter tips intact.  Tele box removed and returned to monitor station.  Pt to be discharged after DME delivered to her room.  Cab voucher will be needed as she has no way home.

## 2017-09-08 NOTE — PLAN OF CARE
CM delivered 4 pronged cane to pt, paperwork signed and returned to [facility]ME dep[facility]. Approval received from Kathy.

## 2017-09-08 NOTE — SUBJECTIVE & OBJECTIVE
Interval History: cough better. Feels at baseline from breathing standpoint    Review of Systems   Constitutional: Negative for chills and fever.   Respiratory: Positive for cough. Negative for shortness of breath and wheezing.    Cardiovascular: Negative for chest pain and palpitations.     Objective:     Vital Signs (Most Recent):  Temp: 97.5 °F (36.4 °C) (09/08/17 0754)  Pulse: (!) 56 (09/08/17 1000)  Resp: (!) 24 (09/08/17 0819)  BP: 136/80 (09/08/17 0754)  SpO2: 99 % (09/08/17 0819) Vital Signs (24h Range):  Temp:  [97.5 °F (36.4 °C)-98 °F (36.7 °C)] 97.5 °F (36.4 °C)  Pulse:  [] 56  Resp:  [14-24] 24  SpO2:  [94 %-99 %] 99 %  BP: (125-142)/(74-91) 136/80     Weight: 110.8 kg (244 lb 4.3 oz)  Body mass index is 39.43 kg/m².    Intake/Output Summary (Last 24 hours) at 09/08/17 1029  Last data filed at 09/08/17 0500   Gross per 24 hour   Intake              750 ml   Output                3 ml   Net              747 ml      Physical Exam   Cardiovascular: Normal rate, regular rhythm and normal heart sounds.    Pulmonary/Chest: Effort normal. No respiratory distress. She has no wheezes.   Abdominal: Soft. Bowel sounds are normal. She exhibits no distension.   Musculoskeletal: She exhibits no edema.       Significant Labs: All pertinent labs within the past 24 hours have been reviewed.    Significant Imaging: I have reviewed all pertinent imaging results/findings within the past 24 hours.

## 2017-09-08 NOTE — PLAN OF CARE
CM received call from [facility] stating pt had been approved for cpap. Unit will be delivered to pt's room this afternoon after 1600. Pt informed and is in agreement with plan. CM to follow.

## 2017-09-08 NOTE — ASSESSMENT & PLAN NOTE
Improved  Bipap 10/5 at night  Prednisone 60 mg x 5 days  Duonebs Q4 PRN  Continue home Breo, Singulair, and Spiriva  Doxycycline 100mg daily  Added codiene for cough

## 2017-09-08 NOTE — PLAN OF CARE
Problem: Patient Care Overview  Goal: Plan of Care Review  Outcome: Ongoing (interventions implemented as appropriate)  Patient in no distress on RA  . Sats 95-99  %. Placed on BIPAPto sleep.Will continue to monitor.

## 2017-09-08 NOTE — DISCHARGE SUMMARY
Admit Date: 9/4/2017    Discharge Date and Time: 09/08/2017      Discharge Attending Physician: Killian Valdivia MD     Diagnoses:  Active Hospital Problems    Diagnosis  POA    *COPD exacerbation [J44.1]  Yes     Priority: 1 - High    Anemia [D64.9]  Yes    GEOVANNI (obstructive sleep apnea) [G47.33]  Yes    Morbid obesity [E66.01]  Yes    Acute bronchitis [J20.9]  Yes      Resolved Hospital Problems    Diagnosis Date Resolved POA    Hypophosphatemia [E83.39] 09/08/2017 Yes    Acute on chronic respiratory failure [J96.20] 09/08/2017 Yes       Discharged Condition: good      Hospital Course:   52 y.o. Female, with history of asthma and COPD, who presents via EMS with complaint of shortness of breath and nonproductive cough that began two days ago. The patient's sister notified EMS after SOB progressively worsened. Per EMS, BP was 150/110. CPAP machine put in place en route. Pt denies chest pain. She denies use of home oxygen, and is compliant with medication. Pt was intubated one week ago at another facility.    Admitted and treated with bipap, steroids, nebs, and antibiotics.  respirtatory status improved.  Coughing was a persistent issues triggering bronchospasm so a codeine based drug was initiated which helped significantly.    Was able to maintain sats off bipap.  wheezing cleared and ambulating with baseline dyspnea.    She need to follow up for a cpap titration study.          Consults: None    Significant Diagnostic Studies:     Cxr: no pna    Disposition: Home or Self Care    Patient Instructions:     Current Discharge Medication List      START taking these medications    Details   benzonatate (TESSALON) 100 MG capsule Take 1 capsule (100 mg total) by mouth 3 (three) times daily.  Qty: 30 capsule, Refills: 0    Associated Diagnoses: COPD exacerbation; Acute bronchitis, unspecified organism      doxycycline (VIBRA-TABS) 100 MG tablet Take 1 tablet (100 mg total) by mouth once daily.  Qty: 5 tablet,  Refills: 0    Associated Diagnoses: COPD exacerbation; Acute bronchitis, unspecified organism      predniSONE (DELTASONE) 20 MG tablet Take 3 tablets (60 mg total) by mouth once daily.  Qty: 15 tablet, Refills: 0    Associated Diagnoses: COPD exacerbation; Acute bronchitis, unspecified organism      promethazine-codeine 6.25-10 mg/5 ml (PHENERGAN WITH CODEINE) 6.25-10 mg/5 mL syrup Take 5 mLs by mouth every 4 (four) hours as needed for Cough.  Qty: 75 mL, Refills: 0    Associated Diagnoses: COPD exacerbation; Acute bronchitis, unspecified organism         CONTINUE these medications which have NOT CHANGED    Details   albuterol 90 mcg/actuation inhaler Inhale 2 puffs into the lungs every 4 (four) hours as needed for Wheezing or Shortness of Breath.  Qty: 1 Inhaler, Refills: 1    Associated Diagnoses: COPD exacerbation      albuterol-ipratropium 2.5mg-0.5mg/3mL (DUO-NEB) 0.5 mg-3 mg(2.5 mg base)/3 mL nebulizer solution Take 3 mLs by nebulization every 4 (four) hours as needed for Wheezing or Shortness of Breath.  Qty: 300 mL, Refills: 1    Associated Diagnoses: COPD exacerbation      amlodipine (NORVASC) 10 MG tablet Take 1 tablet (10 mg total) by mouth once daily.  Qty: 30 tablet, Refills: 3      atorvastatin (LIPITOR) 40 MG tablet Take 40 mg by mouth once daily.      clonazePAM (KLONOPIN) 0.5 MG tablet Take 0.5 mg by mouth 2 (two) times daily as needed for Anxiety.      famotidine (PEPCID AC) 20 MG tablet Take 1 tablet (20 mg total) by mouth 2 (two) times daily.  Qty: 60 tablet, Refills: 0      fluticasone-vilanterol (BREO) 100-25 mcg/dose diskus inhaler Inhale 1 puff into the lungs once daily.  Qty: 1 each, Refills: 0    Associated Diagnoses: COPD exacerbation      gabapentin (NEURONTIN) 100 MG capsule Take 100 mg by mouth 3 (three) times daily.      losartan (COZAAR) 100 MG tablet Take 100 mg by mouth once daily.      meclizine (ANTIVERT) 25 mg tablet Take 25 mg by mouth 3 (three) times daily as needed.       montelukast (SINGULAIR) 10 mg tablet Take 10 mg by mouth every evening.      sucralfate (CARAFATE) 1 gram tablet Take 1 g by mouth 4 (four) times daily.      terbinafine HCl (LAMISIL) 250 mg tablet Take 250 mg by mouth once daily.      tiotropium (SPIRIVA) 18 mcg inhalation capsule Inhale 1 capsule (18 mcg total) into the lungs once daily.  Qty: 30 capsule, Refills: 0    Associated Diagnoses: COPD exacerbation      tramadol (ULTRAM) 50 mg tablet Take 1 tablet (50 mg total) by mouth 2 (two) times daily as needed for Pain.  Qty: 28 tablet, Refills: 0         STOP taking these medications       ibuprofen (ADVIL,MOTRIN) 800 MG tablet Comments:   Reason for Stopping:                 Discharge Procedure Orders  CPAP FOR HOME USE   Order Specific Question Answer Comments   Type: CPAP    CPAP setting (cmH20): 10    Length of need (1-99 months): 99    Humidification: Heated    Type of mask: FFM    Headgear? Yes    Tubing? Yes    Humidifier chamber? Yes    Chin strap? Yes    Filters? Yes      Diet general     Activity as tolerated     Call MD for:  temperature >100.4     Call MD for:  difficulty breathing or increased cough

## 2017-09-08 NOTE — PLAN OF CARE
Problem: Patient Care Overview  Goal: Plan of Care Review  Outcome: Ongoing (interventions implemented as appropriate)  Pt remains on RA with BiPAP at bedside for QHS. TX and MDI given and tolerated well.

## 2017-09-08 NOTE — PLAN OF CARE
Problem: Physical Therapy Goal  Goal: Physical Therapy Goal  Goals to be met by: 17     Patient will increase functional independence with mobility by performin. Sit<>stand transfer with supervision u   2. Gait > 150 feet with SBA without AD  3. Ascend/descend 3 stairs with no handrails with CGA with or without AD.   Pt progressing towards goals, sit to stand w/ Sup, amb'd 200' w/ SBA. Pt should do well at home.

## 2017-09-08 NOTE — PLAN OF CARE
Pt discharging home today after cpap unit is delivered. Pt informed and is agreement with plan. No further CM needs for discharge.      09/08/17 1405   Final Note   Assessment Type Final Discharge Note   Discharge Disposition Home   What phone number can be called within the next 1-3 days to see how you are doing after discharge? [phone]   Hospital Follow Up  Appt(s) scheduled? Yes   Discharge plans and expectations educations in teach back method with documentation complete? Yes   Right Care Referral Info   Post Acute Recommendation No Care
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
## 2017-09-09 NOTE — PROGRESS NOTES
Physical Therapy Discharge Summary    Talia Mueller  MRN: [id number]   COPD exacerbation   Patient Discharged from acute Physical Therapy on 17.  Please refer to prior PT noted date on 17 for functional status.     Assessment:   Patient appropriate for care in another setting.  GOALS:    Physical Therapy Goals        Problem: Physical Therapy Goal    Goal Priority Disciplines Outcome Goal Variances Interventions   Physical Therapy Goal     PT/OT, PT Ongoing (interventions implemented as appropriate)     Description:  Goals to be met by: 17     Patient will increase functional independence with mobility by performin. Sit<>stand transfer with supervision u   2. Gait > 150 feet with SBA without AD  3. Ascend/descend 3 stairs with no handrails with CGA with or without AD.                  Reasons for Discontinuation of Therapy Services  Transfer to alternate level of care.      Plan:  Patient Discharged to: Home no PT services needed.

## 2018-01-01 NOTE — H&P
"[facility] Medicine  History & Physical    Patient Name: Talia Mueller  MRN: [id number]  Admission Date: 9/4/2017  Attending Physician: Laureano Hastings MD   Primary Care Provider: Ludivina Diez MD         Patient information was obtained from patient, past medical records and ER records.     Subjective:     Principal Problem:COPD exacerbation    Chief Complaint:   Chief Complaint   Patient presents with    Shortness of Breath     w/ wheezing, came in on CPAP, unable to talk, denies chest pain        HPI: This is a 52 y.o. Female, with history of asthma and COPD, who presents via EMS with complaint of shortness of breath that began two days ago. The patient's sister notified EMS after SOB progressively worsened. Per EMS, BP was 150/110. CPAP machine put in place en route. Pt denies chest pain. She denies use of home oxygen, and is compliant with medication. Pt was intubated one week ago at another facility.    Past Medical History:   Diagnosis Date    Anxiety     Asthma     Bronchitis     COPD (chronic obstructive pulmonary disease)     Depression     GERD (gastroesophageal reflux disease)     Hypertension     Schizophrenia        Past Surgical History:   Procedure Laterality Date    HERNIA REPAIR      none         Review of patient's allergies indicates:   Allergen Reactions    Aspirin Anaphylaxis    Dilaudid [hydromorphone (bulk)] Other (See Comments)     Pt states dilaudid "makes me paranoid - real spooked". Denies s/s anaphylaxis.        No current facility-administered medications on file prior to encounter.      Current Outpatient Prescriptions on File Prior to Encounter   Medication Sig    albuterol 90 mcg/actuation inhaler Inhale 2 puffs into the lungs every 4 (four) hours as needed for Wheezing or Shortness of Breath.    albuterol-ipratropium 2.5mg-0.5mg/3mL (DUO-NEB) 0.5 mg-3 mg(2.5 mg base)/3 mL nebulizer solution Take 3 mLs by nebulization every 4 (four) " hours as needed for Wheezing or Shortness of Breath.    amlodipine (NORVASC) 10 MG tablet Take 1 tablet (10 mg total) by mouth once daily.    famotidine (PEPCID AC) 20 MG tablet Take 1 tablet (20 mg total) by mouth 2 (two) times daily.    fluticasone-vilanterol (BREO) 100-25 mcg/dose diskus inhaler Inhale 1 puff into the lungs once daily.    losartan (COZAAR) 100 MG tablet Take 100 mg by mouth once daily.    tiotropium (SPIRIVA) 18 mcg inhalation capsule Inhale 1 capsule (18 mcg total) into the lungs once daily.    tramadol (ULTRAM) 50 mg tablet Take 1 tablet (50 mg total) by mouth 2 (two) times daily as needed for Pain.     Family History     Problem Relation (Age of Onset)    Cancer Father    Diabetes Father    Hypertension Mother, Father    Pneumonia Mother        Social History Main Topics    Smoking status: Former Smoker     Packs/day: 3.00     Types: Cigarettes     Quit date: 7/1/2016    Smokeless tobacco: Never Used      Comment: Smoked up to 3 ppd for 33 years.    Alcohol use Yes      Comment: occassional    Drug use: No    Sexual activity: No     Review of Systems   Constitutional: Positive for activity change, appetite change, fatigue and fever.   HENT: Negative for congestion, ear pain, rhinorrhea and sinus pressure.    Eyes: Negative for pain and discharge.   Respiratory: Positive for cough, chest tightness, shortness of breath and wheezing.    Cardiovascular: Negative for chest pain and leg swelling.   Gastrointestinal: Negative for abdominal distention, abdominal pain, diarrhea, nausea and vomiting.   Endocrine: Negative for cold intolerance and heat intolerance.   Genitourinary: Negative for difficulty urinating, flank pain, frequency, hematuria and urgency.   Musculoskeletal: Negative for arthralgias, joint swelling and myalgias.   Skin: Positive for pallor.   Allergic/Immunologic: Negative for environmental allergies and food allergies.   Neurological: Negative for dizziness, weakness,  light-headedness and headaches.   Hematological: Does not bruise/bleed easily.   Psychiatric/Behavioral: Negative for agitation, behavioral problems and decreased concentration.     Objective:     Vital Signs (Most Recent):  Temp: 97.4 °F (36.3 °C) (09/04/17 2227)  Pulse: 94 (09/05/17 0155)  Resp: 20 (09/05/17 0155)  BP: (!) 96/46 (09/05/17 0016)  SpO2: 96 % (09/05/17 0016) Vital Signs (24h Range):  Temp:  [97.4 °F (36.3 °C)] 97.4 °F (36.3 °C)  Pulse:  [] 94  Resp:  [17-44] 20  SpO2:  [96 %-99 %] 96 %  BP: ()/(46-84) 96/46     Weight: 104.3 kg (230 lb)  Body mass index is 40.74 kg/m².    Physical Exam   Constitutional: She is oriented to person, place, and time. She appears well-developed and well-nourished. She appears lethargic.   HENT:   Head: Normocephalic.   Eyes: Conjunctivae are normal. Right eye exhibits no discharge. Left eye exhibits no discharge.   Neck: Normal range of motion. Neck supple.   Cardiovascular: Normal rate, regular rhythm, normal heart sounds and intact distal pulses.    Pulmonary/Chest: Tachypnea noted. She is in respiratory distress. She has wheezes in the left upper field. She has rhonchi in the left upper field.   Abdominal: Soft. Bowel sounds are normal. She exhibits no distension. There is no tenderness.   Musculoskeletal: Normal range of motion.   Neurological: She is oriented to person, place, and time. She has normal strength. She appears lethargic. GCS eye subscore is 4. GCS verbal subscore is 5. GCS motor subscore is 6.   Skin: Skin is warm, dry and intact. Capillary refill takes 2 to 3 seconds.   Psychiatric: She has a normal mood and affect. Her speech is normal and behavior is normal. Judgment and thought content normal. Cognition and memory are normal.        Significant Labs:   CBC:   Recent Labs  Lab 09/04/17  2241   WBC 6.95   HGB 11.7*   HCT 35.9*   *     CMP:   Recent Labs  Lab 09/04/17  2241      K 3.7      CO2 19*   *   BUN 11    CREATININE 0.8   CALCIUM 9.8   PROT 7.8   ALBUMIN 4.0   BILITOT 0.1   ALKPHOS 82   AST 28   ALT 24   ANIONGAP 13   EGFRNONAA >60       Significant Imaging: I have reviewed all pertinent imaging results/findings within the past 24 hours.    Assessment/Plan:     * COPD exacerbation    PCO2-48  Bipap 10/5   Prednisone 60 mg x 5 days  Duonebs Q4 PRN  Continue home Breo, Singulair, and Spiriva  Doxycycline 100mg daily        Productive cough    WBC-6.95  Doxycycline 100mg daily  -Will monitor            VTE Risk Mitigation     None             Prieto Barillas NP  Department of Hospital Medicine   [facility]   negative - no chest pain

## 2021-06-25 ENCOUNTER — HOSPITAL ENCOUNTER (EMERGENCY)
Facility: OTHER | Age: 56
Discharge: HOME | End: 2021-06-26
Attending: EMERGENCY MEDICINE
Payer: MEDICAID

## 2021-06-25 DIAGNOSIS — R06.00 DYSPNEA: ICD-10-CM

## 2021-06-25 DIAGNOSIS — E87.6 HYPOKALEMIA: ICD-10-CM

## 2021-06-25 DIAGNOSIS — F10.920 ALCOHOLIC INTOXICATION WITHOUT COMPLICATION: Primary | ICD-10-CM

## 2021-06-25 LAB
AMPHET+METHAMPHET UR QL: NEGATIVE
ANION GAP SERPL CALC-SCNC: 18 MMOL/L (ref 8–16)
BARBITURATES UR QL SCN>200 NG/ML: NEGATIVE
BASOPHILS # BLD AUTO: 0.06 K/UL (ref 0–0.2)
BASOPHILS NFR BLD: 0.7 % (ref 0–1.9)
BENZODIAZ UR QL SCN>200 NG/ML: NEGATIVE
BUN SERPL-MCNC: 12 MG/DL (ref 6–20)
BZE UR QL SCN: NEGATIVE
CALCIUM SERPL-MCNC: 9.7 MG/DL (ref 8.7–10.5)
CANNABINOIDS UR QL SCN: NEGATIVE
CHLORIDE SERPL-SCNC: 104 MMOL/L (ref 95–110)
CO2 SERPL-SCNC: 18 MMOL/L (ref 23–29)
CREAT SERPL-MCNC: 0.8 MG/DL (ref 0.5–1.4)
CREAT UR-MCNC: 25.6 MG/DL (ref 15–325)
DIFFERENTIAL METHOD: ABNORMAL
EOSINOPHIL # BLD AUTO: 0.1 K/UL (ref 0–0.5)
EOSINOPHIL NFR BLD: 0.7 % (ref 0–8)
ERYTHROCYTE [DISTWIDTH] IN BLOOD BY AUTOMATED COUNT: 26 % (ref 11.5–14.5)
EST. GFR  (AFRICAN AMERICAN): >60 ML/MIN/1.73 M^2
EST. GFR  (NON AFRICAN AMERICAN): >60 ML/MIN/1.73 M^2
ETHANOL SERPL-MCNC: 392 MG/DL
GLUCOSE SERPL-MCNC: 104 MG/DL (ref 70–110)
HCT VFR BLD AUTO: 35.4 % (ref 37–48.5)
HCV AB SERPL QL IA: NEGATIVE
HGB BLD-MCNC: 10.3 G/DL (ref 12–16)
HIV 1+2 AB+HIV1 P24 AG SERPL QL IA: NEGATIVE
IMM GRANULOCYTES # BLD AUTO: 0.04 K/UL (ref 0–0.04)
IMM GRANULOCYTES NFR BLD AUTO: 0.5 % (ref 0–0.5)
LYMPHOCYTES # BLD AUTO: 2.5 K/UL (ref 1–4.8)
LYMPHOCYTES NFR BLD: 30.6 % (ref 18–48)
MCH RBC QN AUTO: 19.5 PG (ref 27–31)
MCHC RBC AUTO-ENTMCNC: 29.1 G/DL (ref 32–36)
MCV RBC AUTO: 67 FL (ref 82–98)
METHADONE UR QL SCN>300 NG/ML: NEGATIVE
MONOCYTES # BLD AUTO: 0.3 K/UL (ref 0.3–1)
MONOCYTES NFR BLD: 3.8 % (ref 4–15)
NEUTROPHILS # BLD AUTO: 5.2 K/UL (ref 1.8–7.7)
NEUTROPHILS NFR BLD: 63.7 % (ref 38–73)
NRBC BLD-RTO: 0 /100 WBC
OPIATES UR QL SCN: NEGATIVE
PCP UR QL SCN>25 NG/ML: NEGATIVE
PLATELET # BLD AUTO: 254 K/UL (ref 150–450)
PMV BLD AUTO: 9.4 FL (ref 9.2–12.9)
POTASSIUM SERPL-SCNC: 3.1 MMOL/L (ref 3.5–5.1)
RBC # BLD AUTO: 5.29 M/UL (ref 4–5.4)
SODIUM SERPL-SCNC: 140 MMOL/L (ref 136–145)
TOXICOLOGY INFORMATION: NORMAL
TROPONIN I SERPL DL<=0.01 NG/ML-MCNC: <0.006 NG/ML (ref 0–0.03)
WBC # BLD AUTO: 8.17 K/UL (ref 3.9–12.7)

## 2021-06-25 PROCEDURE — 25000003 PHARM REV CODE 250: Performed by: EMERGENCY MEDICINE

## 2021-06-25 PROCEDURE — 86703 HIV-1/HIV-2 1 RESULT ANTBDY: CPT | Performed by: EMERGENCY MEDICINE

## 2021-06-25 PROCEDURE — 84484 ASSAY OF TROPONIN QUANT: CPT | Performed by: EMERGENCY MEDICINE

## 2021-06-25 PROCEDURE — 80048 BASIC METABOLIC PNL TOTAL CA: CPT | Performed by: EMERGENCY MEDICINE

## 2021-06-25 PROCEDURE — 85025 COMPLETE CBC W/AUTO DIFF WBC: CPT | Performed by: EMERGENCY MEDICINE

## 2021-06-25 PROCEDURE — 93010 ELECTROCARDIOGRAM REPORT: CPT | Mod: ,,, | Performed by: INTERNAL MEDICINE

## 2021-06-25 PROCEDURE — 86803 HEPATITIS C AB TEST: CPT | Performed by: EMERGENCY MEDICINE

## 2021-06-25 PROCEDURE — 93010 EKG 12-LEAD: ICD-10-PCS | Mod: ,,, | Performed by: INTERNAL MEDICINE

## 2021-06-25 PROCEDURE — 96361 HYDRATE IV INFUSION ADD-ON: CPT

## 2021-06-25 PROCEDURE — 96360 HYDRATION IV INFUSION INIT: CPT

## 2021-06-25 PROCEDURE — 80307 DRUG TEST PRSMV CHEM ANLYZR: CPT | Performed by: EMERGENCY MEDICINE

## 2021-06-25 PROCEDURE — 82077 ASSAY SPEC XCP UR&BREATH IA: CPT | Performed by: EMERGENCY MEDICINE

## 2021-06-25 PROCEDURE — 93005 ELECTROCARDIOGRAM TRACING: CPT

## 2021-06-25 PROCEDURE — 99284 EMERGENCY DEPT VISIT MOD MDM: CPT | Mod: 25

## 2021-06-25 RX ORDER — SODIUM CHLORIDE 9 MG/ML
INJECTION, SOLUTION INTRAVENOUS
Status: COMPLETED | OUTPATIENT
Start: 2021-06-25 | End: 2021-06-26

## 2021-06-25 RX ADMIN — SODIUM CHLORIDE: 0.9 INJECTION, SOLUTION INTRAVENOUS at 09:06

## 2021-06-26 VITALS
RESPIRATION RATE: 19 BRPM | SYSTOLIC BLOOD PRESSURE: 159 MMHG | TEMPERATURE: 98 F | BODY MASS INDEX: 43.32 KG/M2 | WEIGHT: 260 LBS | DIASTOLIC BLOOD PRESSURE: 107 MMHG | HEART RATE: 97 BPM | HEIGHT: 65 IN | OXYGEN SATURATION: 98 %

## 2021-06-26 PROCEDURE — 25000003 PHARM REV CODE 250: Performed by: EMERGENCY MEDICINE

## 2021-06-26 RX ORDER — POTASSIUM CHLORIDE 20 MEQ/1
40 TABLET, EXTENDED RELEASE ORAL
Status: COMPLETED | OUTPATIENT
Start: 2021-06-26 | End: 2021-06-26

## 2021-06-26 RX ORDER — MECLIZINE HYDROCHLORIDE 25 MG/1
50 TABLET ORAL
Status: COMPLETED | OUTPATIENT
Start: 2021-06-26 | End: 2021-06-26

## 2021-06-26 RX ADMIN — POTASSIUM CHLORIDE 40 MEQ: 1500 TABLET, EXTENDED RELEASE ORAL at 12:06

## 2021-06-26 RX ADMIN — MECLIZINE HYDROCHLORIDE 50 MG: 25 TABLET ORAL at 06:06

## 2021-11-27 ENCOUNTER — HOSPITAL ENCOUNTER (EMERGENCY)
Facility: OTHER | Age: 56
Discharge: HOME | End: 2021-11-27
Attending: EMERGENCY MEDICINE
Payer: MEDICAID

## 2021-11-27 VITALS
DIASTOLIC BLOOD PRESSURE: 62 MMHG | TEMPERATURE: 98 F | SYSTOLIC BLOOD PRESSURE: 125 MMHG | RESPIRATION RATE: 18 BRPM | OXYGEN SATURATION: 98 % | HEART RATE: 72 BPM

## 2021-11-27 DIAGNOSIS — F10.920 ALCOHOLIC INTOXICATION WITHOUT COMPLICATION: Primary | ICD-10-CM

## 2021-11-27 LAB
ETHANOL SERPL-MCNC: 363 MG/DL
POCT GLUCOSE: 101 MG/DL (ref 70–110)
POCT GLUCOSE: 63 MG/DL (ref 70–110)

## 2021-11-27 PROCEDURE — 80307 DRUG TEST PRSMV CHEM ANLYZR: CPT | Performed by: EMERGENCY MEDICINE

## 2021-11-27 PROCEDURE — 96360 HYDRATION IV INFUSION INIT: CPT

## 2021-11-27 PROCEDURE — 27000190 HC CPAP FULL FACE MASK W/VALVE

## 2021-11-27 PROCEDURE — 96361 HYDRATE IV INFUSION ADD-ON: CPT

## 2021-11-27 PROCEDURE — 94660 CPAP INITIATION&MGMT: CPT

## 2021-11-27 PROCEDURE — 99284 EMERGENCY DEPT VISIT MOD MDM: CPT | Mod: 25

## 2021-11-27 PROCEDURE — 94761 N-INVAS EAR/PLS OXIMETRY MLT: CPT

## 2021-11-27 PROCEDURE — 82962 GLUCOSE BLOOD TEST: CPT

## 2021-11-27 PROCEDURE — 99900035 HC TECH TIME PER 15 MIN (STAT)

## 2021-11-27 PROCEDURE — 25000003 PHARM REV CODE 250: Performed by: EMERGENCY MEDICINE

## 2021-11-27 PROCEDURE — 27000221 HC OXYGEN, UP TO 24 HOURS

## 2021-11-27 PROCEDURE — 82077 ASSAY SPEC XCP UR&BREATH IA: CPT | Performed by: EMERGENCY MEDICINE

## 2021-11-27 RX ORDER — SODIUM CHLORIDE 9 MG/ML
INJECTION, SOLUTION INTRAVENOUS
Status: COMPLETED | OUTPATIENT
Start: 2021-11-27 | End: 2021-11-27

## 2021-11-27 RX ADMIN — SODIUM CHLORIDE: 0.9 INJECTION, SOLUTION INTRAVENOUS at 03:11

## 2021-11-29 LAB
AMPHET+METHAMPHET UR QL: NEGATIVE
BARBITURATES UR QL SCN>200 NG/ML: NEGATIVE
BENZODIAZ UR QL SCN>200 NG/ML: NEGATIVE
BZE UR QL SCN: NEGATIVE
CANNABINOIDS UR QL SCN: NEGATIVE
CREAT UR-MCNC: 18.4 MG/DL (ref 15–325)
METHADONE UR QL SCN>300 NG/ML: NEGATIVE
OPIATES UR QL SCN: NEGATIVE
PCP UR QL SCN>25 NG/ML: NEGATIVE
TOXICOLOGY INFORMATION: NORMAL

## 2022-08-03 ENCOUNTER — CLINICAL SUPPORT (OUTPATIENT)
Dept: URGENT CARE | Facility: CLINIC | Age: 57
End: 2022-08-03
Payer: MEDICAID

## 2022-08-03 DIAGNOSIS — Z11.52 ENCOUNTER FOR SCREENING LABORATORY TESTING FOR COVID-19 VIRUS: Primary | ICD-10-CM

## 2022-08-03 DIAGNOSIS — Z20.822 ENCOUNTER FOR LABORATORY TESTING FOR COVID-19 VIRUS: ICD-10-CM

## 2022-08-03 LAB
CTP QC/QA: YES
SARS-COV-2 RDRP RESP QL NAA+PROBE: NEGATIVE

## 2022-08-03 PROCEDURE — U0002 COVID-19 LAB TEST NON-CDC: HCPCS | Mod: QW,S$GLB,, | Performed by: PHYSICIAN ASSISTANT

## 2022-08-03 PROCEDURE — U0002: ICD-10-PCS | Mod: QW,S$GLB,, | Performed by: PHYSICIAN ASSISTANT

## 2022-08-03 PROCEDURE — 99211 PR OFFICE/OUTPT VISIT, EST, LEVL I: ICD-10-PCS | Mod: S$GLB,,, | Performed by: NURSE PRACTITIONER

## 2022-08-03 PROCEDURE — 99211 OFF/OP EST MAY X REQ PHY/QHP: CPT | Mod: S$GLB,,, | Performed by: NURSE PRACTITIONER

## 2022-08-08 NOTE — PROGRESS NOTES
Subjective:       Patient ID: Talia Mueller is a 57 y.o. female.    Chief Complaint: Labs Only    HPI  ROS     Objective:      Physical Exam    Assessment:       1. Encounter for screening laboratory testing for COVID-19 virus    2. Encounter for laboratory testing for COVID-19 virus        Plan:                   No follow-ups on file.

## 2023-06-10 ENCOUNTER — HOSPITAL ENCOUNTER (INPATIENT)
Facility: OTHER | Age: 58
LOS: 7 days | Discharge: HOME | DRG: 189 | End: 2023-06-17
Attending: EMERGENCY MEDICINE | Admitting: INTERNAL MEDICINE
Payer: MEDICAID

## 2023-06-10 DIAGNOSIS — G47.33 OSA (OBSTRUCTIVE SLEEP APNEA): ICD-10-CM

## 2023-06-10 DIAGNOSIS — R09.02 HYPOXIA: ICD-10-CM

## 2023-06-10 DIAGNOSIS — R07.9 CHEST PAIN: ICD-10-CM

## 2023-06-10 DIAGNOSIS — J96.01 ACUTE HYPOXEMIC RESPIRATORY FAILURE: ICD-10-CM

## 2023-06-10 DIAGNOSIS — E66.01 MORBID OBESITY: ICD-10-CM

## 2023-06-10 DIAGNOSIS — J95.03 TRACHEAL STENOSIS DUE TO TRACHEOSTOMY: ICD-10-CM

## 2023-06-10 DIAGNOSIS — I10 ESSENTIAL HYPERTENSION: ICD-10-CM

## 2023-06-10 DIAGNOSIS — J44.1 COPD EXACERBATION: Primary | ICD-10-CM

## 2023-06-10 DIAGNOSIS — J45.41 MODERATE PERSISTENT ASTHMATIC BRONCHITIS WITH ACUTE EXACERBATION: ICD-10-CM

## 2023-06-10 DIAGNOSIS — R06.00 DYSPNEA: ICD-10-CM

## 2023-06-10 LAB
ANION GAP SERPL CALC-SCNC: 16 MMOL/L (ref 8–16)
BASOPHILS # BLD AUTO: 0.02 K/UL (ref 0–0.2)
BASOPHILS NFR BLD: 0.4 % (ref 0–1.9)
BNP SERPL-MCNC: 20 PG/ML (ref 0–99)
BUN SERPL-MCNC: 16 MG/DL (ref 6–20)
CALCIUM SERPL-MCNC: 9.4 MG/DL (ref 8.7–10.5)
CHLORIDE SERPL-SCNC: 107 MMOL/L (ref 95–110)
CO2 SERPL-SCNC: 20 MMOL/L (ref 23–29)
CREAT SERPL-MCNC: 1.3 MG/DL (ref 0.5–1.4)
CTP QC/QA: YES
CTP QC/QA: YES
DIFFERENTIAL METHOD: ABNORMAL
EOSINOPHIL # BLD AUTO: 0.1 K/UL (ref 0–0.5)
EOSINOPHIL NFR BLD: 2.5 % (ref 0–8)
ERYTHROCYTE [DISTWIDTH] IN BLOOD BY AUTOMATED COUNT: 16.4 % (ref 11.5–14.5)
EST. GFR  (NO RACE VARIABLE): 48 ML/MIN/1.73 M^2
GLUCOSE SERPL-MCNC: 142 MG/DL (ref 70–110)
HCO3 UR-SCNC: 23.4 MMOL/L (ref 24–28)
HCT VFR BLD AUTO: 35.3 % (ref 37–48.5)
HCT VFR BLD CALC: 41 %PCV (ref 36–54)
HGB BLD-MCNC: 11.2 G/DL (ref 12–16)
HGB BLD-MCNC: 14 G/DL
IMM GRANULOCYTES # BLD AUTO: 0.01 K/UL (ref 0–0.04)
IMM GRANULOCYTES NFR BLD AUTO: 0.2 % (ref 0–0.5)
LYMPHOCYTES # BLD AUTO: 2.2 K/UL (ref 1–4.8)
LYMPHOCYTES NFR BLD: 43.7 % (ref 18–48)
MCH RBC QN AUTO: 25.3 PG (ref 27–31)
MCHC RBC AUTO-ENTMCNC: 31.7 G/DL (ref 32–36)
MCV RBC AUTO: 80 FL (ref 82–98)
MONOCYTES # BLD AUTO: 0.3 K/UL (ref 0.3–1)
MONOCYTES NFR BLD: 5.3 % (ref 4–15)
NEUTROPHILS # BLD AUTO: 2.5 K/UL (ref 1.8–7.7)
NEUTROPHILS NFR BLD: 47.9 % (ref 38–73)
NRBC BLD-RTO: 0 /100 WBC
PCO2 BLDA: 43.8 MMHG (ref 35–45)
PH SMN: 7.34 [PH] (ref 7.35–7.45)
PLATELET # BLD AUTO: 308 K/UL (ref 150–450)
PLATELET BLD QL SMEAR: ABNORMAL
PMV BLD AUTO: 9.5 FL (ref 9.2–12.9)
PO2 BLDA: 45 MMHG (ref 40–60)
POC BE: -2 MMOL/L
POC IONIZED CALCIUM: 1.22 MMOL/L (ref 1.06–1.42)
POC MOLECULAR INFLUENZA A AGN: NEGATIVE
POC MOLECULAR INFLUENZA B AGN: NEGATIVE
POC SATURATED O2: 77 % (ref 95–100)
POC TCO2: 25 MMOL/L (ref 24–29)
POTASSIUM BLD-SCNC: 3.2 MMOL/L (ref 3.5–5.1)
POTASSIUM SERPL-SCNC: 3.3 MMOL/L (ref 3.5–5.1)
RBC # BLD AUTO: 4.43 M/UL (ref 4–5.4)
SAMPLE: ABNORMAL
SARS-COV-2 RDRP RESP QL NAA+PROBE: NEGATIVE
SODIUM BLD-SCNC: 142 MMOL/L (ref 136–145)
SODIUM SERPL-SCNC: 143 MMOL/L (ref 136–145)
TROPONIN I SERPL DL<=0.01 NG/ML-MCNC: <0.006 NG/ML (ref 0–0.03)
WBC # BLD AUTO: 5.13 K/UL (ref 3.9–12.7)

## 2023-06-10 PROCEDURE — 99900035 HC TECH TIME PER 15 MIN (STAT)

## 2023-06-10 PROCEDURE — 85025 COMPLETE CBC W/AUTO DIFF WBC: CPT | Performed by: EMERGENCY MEDICINE

## 2023-06-10 PROCEDURE — 93005 ELECTROCARDIOGRAM TRACING: CPT

## 2023-06-10 PROCEDURE — 94644 CONT INHLJ TX 1ST HOUR: CPT

## 2023-06-10 PROCEDURE — 25000003 PHARM REV CODE 250: Performed by: EMERGENCY MEDICINE

## 2023-06-10 PROCEDURE — G0378 HOSPITAL OBSERVATION PER HR: HCPCS

## 2023-06-10 PROCEDURE — 84484 ASSAY OF TROPONIN QUANT: CPT | Performed by: EMERGENCY MEDICINE

## 2023-06-10 PROCEDURE — 87635 SARS-COV-2 COVID-19 AMP PRB: CPT | Performed by: EMERGENCY MEDICINE

## 2023-06-10 PROCEDURE — 82803 BLOOD GASES ANY COMBINATION: CPT

## 2023-06-10 PROCEDURE — 94761 N-INVAS EAR/PLS OXIMETRY MLT: CPT

## 2023-06-10 PROCEDURE — 83880 ASSAY OF NATRIURETIC PEPTIDE: CPT | Performed by: EMERGENCY MEDICINE

## 2023-06-10 PROCEDURE — 80048 BASIC METABOLIC PNL TOTAL CA: CPT | Performed by: EMERGENCY MEDICINE

## 2023-06-10 PROCEDURE — 25000242 PHARM REV CODE 250 ALT 637 W/ HCPCS: Performed by: EMERGENCY MEDICINE

## 2023-06-10 PROCEDURE — 82800 BLOOD PH: CPT

## 2023-06-10 PROCEDURE — 93010 EKG 12-LEAD: ICD-10-PCS | Mod: ,,, | Performed by: INTERNAL MEDICINE

## 2023-06-10 PROCEDURE — 93010 ELECTROCARDIOGRAM REPORT: CPT | Mod: ,,, | Performed by: INTERNAL MEDICINE

## 2023-06-10 PROCEDURE — 11000001 HC ACUTE MED/SURG PRIVATE ROOM

## 2023-06-10 PROCEDURE — 94640 AIRWAY INHALATION TREATMENT: CPT

## 2023-06-10 PROCEDURE — 96360 HYDRATION IV INFUSION INIT: CPT

## 2023-06-10 PROCEDURE — 99285 EMERGENCY DEPT VISIT HI MDM: CPT | Mod: 25

## 2023-06-10 PROCEDURE — 63600175 PHARM REV CODE 636 W HCPCS: Performed by: EMERGENCY MEDICINE

## 2023-06-10 RX ORDER — IPRATROPIUM BROMIDE AND ALBUTEROL SULFATE 2.5; .5 MG/3ML; MG/3ML
3 SOLUTION RESPIRATORY (INHALATION)
Status: COMPLETED | OUTPATIENT
Start: 2023-06-10 | End: 2023-06-10

## 2023-06-10 RX ORDER — SODIUM CHLORIDE 9 MG/ML
1000 INJECTION, SOLUTION INTRAVENOUS
Status: COMPLETED | OUTPATIENT
Start: 2023-06-10 | End: 2023-06-10

## 2023-06-10 RX ORDER — ALBUTEROL SULFATE 2.5 MG/.5ML
15 SOLUTION RESPIRATORY (INHALATION)
Status: COMPLETED | OUTPATIENT
Start: 2023-06-10 | End: 2023-06-10

## 2023-06-10 RX ORDER — PREDNISONE 20 MG/1
60 TABLET ORAL
Status: COMPLETED | OUTPATIENT
Start: 2023-06-10 | End: 2023-06-10

## 2023-06-10 RX ADMIN — PREDNISONE 60 MG: 20 TABLET ORAL at 06:06

## 2023-06-10 RX ADMIN — ALBUTEROL SULFATE 15 MG: 2.5 SOLUTION RESPIRATORY (INHALATION) at 07:06

## 2023-06-10 RX ADMIN — IPRATROPIUM BROMIDE AND ALBUTEROL SULFATE 3 ML: .5; 3 SOLUTION RESPIRATORY (INHALATION) at 10:06

## 2023-06-10 RX ADMIN — SODIUM CHLORIDE 1000 ML: 9 INJECTION, SOLUTION INTRAVENOUS at 07:06

## 2023-06-10 NOTE — ED PROVIDER NOTES
"SCRIBE #1 NOTE: I, Jasmine Minor, am scribing for, and in the presence of,  Navjot Aquino DO.       Source of History:  The patient.    Chief complaint:  Shortness of Breath (Per EMS SOB X4 days, hx COPD and asthma, took albuterol with no relief.)      HPI:  Talia Mueller is a 58 y.o. female, with a current PMHx of COPD and asthma and with a cardiac follow-up scheduled, presenting to the ED via EMS with severe shortness of breath for the last week. She denies any fevers.    This is the extent to the patients complaints today here in the emergency department.    ROS:   See HPI.    Review of patient's allergies indicates:   Allergen Reactions    Aspirin Anaphylaxis    Dilaudid [hydromorphone (bulk)] Other (See Comments)     Pt states dilaudid "makes me paranoid - real spooked". Denies s/s anaphylaxis.     Shellfish containing products Hives       PMH:  As per HPI and below:  Past Medical History:   Diagnosis Date    Anxiety     Asthma     Bronchitis     COPD (chronic obstructive pulmonary disease)     Depression     GERD (gastroesophageal reflux disease)     Hypertension     Schizophrenia      Past Surgical History:   Procedure Laterality Date    HERNIA REPAIR      none         Social History     Tobacco Use    Smoking status: Former     Packs/day: 3.00     Types: Cigarettes     Quit date: 2016     Years since quittin.9    Smokeless tobacco: Never    Tobacco comments:     Smoked up to 3 ppd for 33 years.   Substance Use Topics    Alcohol use: Yes     Comment: occassional    Drug use: No       Physical Exam:    /89 (BP Location: Left arm, Patient Position: Lying)   Pulse 88   Temp 97.5 °F (36.4 °C) (Oral)   Resp (!) 22   Ht 5' 6" (1.676 m)   Wt 91.6 kg (202 lb)   LMP 2016   SpO2 (!) 93%   BMI 32.60 kg/m²   Nursing note and vital signs reviewed.  Constitutional: No acute distress.  Nontoxic  Cardiovascular:  Regular rate and rhythm no murmurs gallops or rubs  Chest/ Respiratory: "  Diffuse audible inspiratory and expiratory wheezing. Rhonchi. Abdomen: Soft.  Not distended.  Nontender.  No guarding.  No rebound. Non-peritoneal.  Neuro: Alert.   Skin: no rash noted    Critical Care    Date/Time: 6/10/2023 10:29 PM  Performed by: Navjot Aquino DO  Authorized by: Miguel Villanueva MD   Total critical care time (exclusive of procedural time) : 45 minutes         I decided to obtain the patient's medical records.  Summary of Medical Records:  Reviewed outside notes from an outside facility [facility] pulmonology from 4 days ago.  Unable to see all notes due to the outside facility however was there for pulmonary function testing has a diagnosis of severe asthma.    04/19/2023 reviewed outside facility emergency department visit at [facility] for COPD exacerbation.  Labs were unimpressive per the chart as well as chest x-ray and was discharged after treatments.    MDM/ Differential Dx:  58-year-old female with what appears to be a COPD exacerbation.  Known history of severe asthma/COPD.  Still smokes.  Although she has significant inspiratory expiratory audible wheezing the patient's O2 sats are 99% and does not appear to have any significant accessory muscle use or tachypnea.  Also her pulses normal.  Will give breathing treatments I do feel there is a component of forced wheezing noted as well.  Will also get blood work to evaluate for cardiac etiology.  Considered but history is inconsistent with COVID or pneumonia.  I have considered but do not suspect dissection or pulmonary embolus.      ED Course as of 06/10/23 2229   Sat Clement 10, 2023   1854 EKG 12-lead  EKG independently interpreted by myself shows normal sinus rhythm at a rate of 91, normal intervals, narrow QRS, no acute ST T wave abnormalities.  Compared to an EKG on June 25, 2021 shows no significant change. [SM]   1931 POC PH(!): 7.337 [SM]   1931 Sodium: 143 [SM]   1931 Potassium(!): 3.3 [SM]   1931 Creatinine: 1.3 [SM]    1931 WBC: 5.13 [SM]   1931 Hemoglobin(!): 11.2 [SM]   1931 Platelets: 308 [SM]   1938 X-Ray Chest AP Portable  Chest x-ray independently interpreted by myself shows normal cardiac silhouette, no focal consolidation, some increased interstitial bibasilar markings.  No pneumothorax or bony abnormalities.  Overall impression no significant disease. [SM]   2101 POC Molecular Influenza A Ag: Negative [SM]   2101 POC Molecular Influenza B Ag: Negative [SM]   2101 SARS-CoV-2 RNA, Amplification, Qual: Negative [SM]   2139 Pulse: 81 [SM]   2139 SpO2: 97 % []   2209 On ambulatory test patient's oxygen saturations dropped to 86%.  Will admit to Hospital Medicine for hypoxia and persistent breathing treatments. []   2229 Discussed with Hospital Medicine will admit to Dr. Villanueva. []      ED Course User Index  [] Navjot Aquino DO              Scribe Attestation:   Scribe #1: I performed the above scribed service and the documentation accurately describes the services I performed. I attest to the accuracy of the note.    Physician Attestation for Scribe: I, Dr Navjot Aquino, reviewed documentation as scribed in my presence, which is both accurate and complete.    Diagnostic Impression:    1. COPD exacerbation    2. Dyspnea         ED Disposition Condition    Observation                   Navjot Aquino DO  06/10/23 2230

## 2023-06-11 PROBLEM — I10 ESSENTIAL HYPERTENSION: Status: ACTIVE | Noted: 2023-06-11

## 2023-06-11 LAB
ANION GAP SERPL CALC-SCNC: 12 MMOL/L (ref 8–16)
BASOPHILS # BLD AUTO: 0.01 K/UL (ref 0–0.2)
BASOPHILS NFR BLD: 0.3 % (ref 0–1.9)
BUN SERPL-MCNC: 17 MG/DL (ref 6–20)
CALCIUM SERPL-MCNC: 9.5 MG/DL (ref 8.7–10.5)
CHLORIDE SERPL-SCNC: 108 MMOL/L (ref 95–110)
CO2 SERPL-SCNC: 19 MMOL/L (ref 23–29)
CREAT SERPL-MCNC: 0.9 MG/DL (ref 0.5–1.4)
DIFFERENTIAL METHOD: ABNORMAL
EOSINOPHIL # BLD AUTO: 0 K/UL (ref 0–0.5)
EOSINOPHIL NFR BLD: 0 % (ref 0–8)
ERYTHROCYTE [DISTWIDTH] IN BLOOD BY AUTOMATED COUNT: 16.6 % (ref 11.5–14.5)
EST. GFR  (NO RACE VARIABLE): >60 ML/MIN/1.73 M^2
GLUCOSE SERPL-MCNC: 186 MG/DL (ref 70–110)
HCT VFR BLD AUTO: 34.9 % (ref 37–48.5)
HGB BLD-MCNC: 10.9 G/DL (ref 12–16)
IMM GRANULOCYTES # BLD AUTO: 0.08 K/UL (ref 0–0.04)
IMM GRANULOCYTES NFR BLD AUTO: 2.1 % (ref 0–0.5)
LYMPHOCYTES # BLD AUTO: 0.7 K/UL (ref 1–4.8)
LYMPHOCYTES NFR BLD: 18.4 % (ref 18–48)
MAGNESIUM SERPL-MCNC: 1.6 MG/DL (ref 1.6–2.6)
MCH RBC QN AUTO: 25.2 PG (ref 27–31)
MCHC RBC AUTO-ENTMCNC: 31.2 G/DL (ref 32–36)
MCV RBC AUTO: 81 FL (ref 82–98)
MONOCYTES # BLD AUTO: 0.1 K/UL (ref 0.3–1)
MONOCYTES NFR BLD: 1.9 % (ref 4–15)
NEUTROPHILS # BLD AUTO: 2.9 K/UL (ref 1.8–7.7)
NEUTROPHILS NFR BLD: 77.3 % (ref 38–73)
NRBC BLD-RTO: 0 /100 WBC
PLATELET # BLD AUTO: 294 K/UL (ref 150–450)
PMV BLD AUTO: 9.8 FL (ref 9.2–12.9)
POTASSIUM SERPL-SCNC: 4.3 MMOL/L (ref 3.5–5.1)
RBC # BLD AUTO: 4.32 M/UL (ref 4–5.4)
SODIUM SERPL-SCNC: 139 MMOL/L (ref 136–145)
WBC # BLD AUTO: 3.75 K/UL (ref 3.9–12.7)

## 2023-06-11 PROCEDURE — 96372 THER/PROPH/DIAG INJ SC/IM: CPT | Performed by: PHYSICIAN ASSISTANT

## 2023-06-11 PROCEDURE — 85025 COMPLETE CBC W/AUTO DIFF WBC: CPT | Performed by: PHYSICIAN ASSISTANT

## 2023-06-11 PROCEDURE — 36415 COLL VENOUS BLD VENIPUNCTURE: CPT | Performed by: PHYSICIAN ASSISTANT

## 2023-06-11 PROCEDURE — 21400001 HC TELEMETRY ROOM

## 2023-06-11 PROCEDURE — 25000003 PHARM REV CODE 250: Performed by: PHYSICIAN ASSISTANT

## 2023-06-11 PROCEDURE — 25000242 PHARM REV CODE 250 ALT 637 W/ HCPCS: Performed by: INTERNAL MEDICINE

## 2023-06-11 PROCEDURE — 83735 ASSAY OF MAGNESIUM: CPT | Performed by: PHYSICIAN ASSISTANT

## 2023-06-11 PROCEDURE — 99900035 HC TECH TIME PER 15 MIN (STAT)

## 2023-06-11 PROCEDURE — G0378 HOSPITAL OBSERVATION PER HR: HCPCS

## 2023-06-11 PROCEDURE — 25000242 PHARM REV CODE 250 ALT 637 W/ HCPCS: Performed by: PHYSICIAN ASSISTANT

## 2023-06-11 PROCEDURE — 99223 PR INITIAL HOSPITAL CARE,LEVL III: ICD-10-PCS | Mod: ,,, | Performed by: PHYSICIAN ASSISTANT

## 2023-06-11 PROCEDURE — 99223 1ST HOSP IP/OBS HIGH 75: CPT | Mod: ,,, | Performed by: PHYSICIAN ASSISTANT

## 2023-06-11 PROCEDURE — 94640 AIRWAY INHALATION TREATMENT: CPT | Mod: XB

## 2023-06-11 PROCEDURE — 94761 N-INVAS EAR/PLS OXIMETRY MLT: CPT

## 2023-06-11 PROCEDURE — 80048 BASIC METABOLIC PNL TOTAL CA: CPT | Performed by: PHYSICIAN ASSISTANT

## 2023-06-11 PROCEDURE — 27000221 HC OXYGEN, UP TO 24 HOURS

## 2023-06-11 PROCEDURE — A4216 STERILE WATER/SALINE, 10 ML: HCPCS | Performed by: PHYSICIAN ASSISTANT

## 2023-06-11 PROCEDURE — 63600175 PHARM REV CODE 636 W HCPCS: Performed by: PHYSICIAN ASSISTANT

## 2023-06-11 PROCEDURE — 25000003 PHARM REV CODE 250: Performed by: INTERNAL MEDICINE

## 2023-06-11 RX ORDER — IPRATROPIUM BROMIDE AND ALBUTEROL SULFATE 2.5; .5 MG/3ML; MG/3ML
3 SOLUTION RESPIRATORY (INHALATION) EVERY 4 HOURS PRN
Status: DISCONTINUED | OUTPATIENT
Start: 2023-06-11 | End: 2023-06-11

## 2023-06-11 RX ORDER — NALOXONE HCL 0.4 MG/ML
0.02 VIAL (ML) INJECTION
Status: DISCONTINUED | OUTPATIENT
Start: 2023-06-11 | End: 2023-06-17 | Stop reason: HOSPADM

## 2023-06-11 RX ORDER — TALC
6 POWDER (GRAM) TOPICAL NIGHTLY PRN
Status: DISCONTINUED | OUTPATIENT
Start: 2023-06-11 | End: 2023-06-17 | Stop reason: HOSPADM

## 2023-06-11 RX ORDER — SODIUM CHLORIDE 0.9 % (FLUSH) 0.9 %
10 SYRINGE (ML) INJECTION EVERY 8 HOURS
Status: DISCONTINUED | OUTPATIENT
Start: 2023-06-11 | End: 2023-06-17 | Stop reason: HOSPADM

## 2023-06-11 RX ORDER — METHYLPREDNISOLONE SOD SUCC 125 MG
125 VIAL (EA) INJECTION EVERY 6 HOURS
Status: DISCONTINUED | OUTPATIENT
Start: 2023-06-11 | End: 2023-06-12

## 2023-06-11 RX ORDER — AMOXICILLIN 250 MG
1 CAPSULE ORAL 2 TIMES DAILY PRN
Status: DISCONTINUED | OUTPATIENT
Start: 2023-06-11 | End: 2023-06-17 | Stop reason: HOSPADM

## 2023-06-11 RX ORDER — AMLODIPINE BESYLATE 5 MG/1
10 TABLET ORAL DAILY
Status: DISCONTINUED | OUTPATIENT
Start: 2023-06-11 | End: 2023-06-17 | Stop reason: HOSPADM

## 2023-06-11 RX ORDER — SODIUM CHLORIDE 0.9 % (FLUSH) 0.9 %
3 SYRINGE (ML) INJECTION
Status: DISCONTINUED | OUTPATIENT
Start: 2023-06-11 | End: 2023-06-17 | Stop reason: HOSPADM

## 2023-06-11 RX ORDER — TRAMADOL HYDROCHLORIDE 50 MG/1
50 TABLET ORAL 2 TIMES DAILY PRN
Status: DISCONTINUED | OUTPATIENT
Start: 2023-06-11 | End: 2023-06-17 | Stop reason: HOSPADM

## 2023-06-11 RX ORDER — MIRTAZAPINE 15 MG/1
15 TABLET, FILM COATED ORAL NIGHTLY
Status: DISCONTINUED | OUTPATIENT
Start: 2023-06-11 | End: 2023-06-17 | Stop reason: HOSPADM

## 2023-06-11 RX ORDER — LEVOFLOXACIN 5 MG/ML
750 INJECTION, SOLUTION INTRAVENOUS
Status: DISCONTINUED | OUTPATIENT
Start: 2023-06-11 | End: 2023-06-15

## 2023-06-11 RX ORDER — PREDNISONE 20 MG/1
40 TABLET ORAL DAILY
Status: DISCONTINUED | OUTPATIENT
Start: 2023-06-11 | End: 2023-06-11

## 2023-06-11 RX ORDER — IPRATROPIUM BROMIDE AND ALBUTEROL SULFATE 2.5; .5 MG/3ML; MG/3ML
3 SOLUTION RESPIRATORY (INHALATION) EVERY 4 HOURS
Status: DISCONTINUED | OUTPATIENT
Start: 2023-06-11 | End: 2023-06-15

## 2023-06-11 RX ORDER — RISPERIDONE 1 MG/1
1 TABLET ORAL 2 TIMES DAILY
Status: DISCONTINUED | OUTPATIENT
Start: 2023-06-11 | End: 2023-06-17 | Stop reason: HOSPADM

## 2023-06-11 RX ORDER — TAMSULOSIN HYDROCHLORIDE 0.4 MG/1
0.4 CAPSULE ORAL DAILY
Status: DISCONTINUED | OUTPATIENT
Start: 2023-06-11 | End: 2023-06-17 | Stop reason: HOSPADM

## 2023-06-11 RX ORDER — LOSARTAN POTASSIUM 50 MG/1
100 TABLET ORAL DAILY
Status: DISCONTINUED | OUTPATIENT
Start: 2023-06-11 | End: 2023-06-17 | Stop reason: HOSPADM

## 2023-06-11 RX ORDER — FLUTICASONE FUROATE AND VILANTEROL 200; 25 UG/1; UG/1
1 POWDER RESPIRATORY (INHALATION) DAILY
Status: DISCONTINUED | OUTPATIENT
Start: 2023-06-11 | End: 2023-06-17 | Stop reason: HOSPADM

## 2023-06-11 RX ORDER — ACETAMINOPHEN 325 MG/1
650 TABLET ORAL EVERY 6 HOURS PRN
Status: DISCONTINUED | OUTPATIENT
Start: 2023-06-11 | End: 2023-06-17 | Stop reason: HOSPADM

## 2023-06-11 RX ORDER — ATORVASTATIN CALCIUM 20 MG/1
40 TABLET, FILM COATED ORAL DAILY
Status: DISCONTINUED | OUTPATIENT
Start: 2023-06-11 | End: 2023-06-17 | Stop reason: HOSPADM

## 2023-06-11 RX ORDER — SUCRALFATE 1 G/1
1 TABLET ORAL 4 TIMES DAILY
Status: DISCONTINUED | OUTPATIENT
Start: 2023-06-11 | End: 2023-06-17 | Stop reason: HOSPADM

## 2023-06-11 RX ORDER — GABAPENTIN 100 MG/1
100 CAPSULE ORAL 3 TIMES DAILY
Status: DISCONTINUED | OUTPATIENT
Start: 2023-06-11 | End: 2023-06-17 | Stop reason: HOSPADM

## 2023-06-11 RX ORDER — HEPARIN SODIUM 5000 [USP'U]/ML
5000 INJECTION, SOLUTION INTRAVENOUS; SUBCUTANEOUS EVERY 8 HOURS
Status: DISCONTINUED | OUTPATIENT
Start: 2023-06-11 | End: 2023-06-17 | Stop reason: HOSPADM

## 2023-06-11 RX ORDER — CLONAZEPAM 0.5 MG/1
0.5 TABLET ORAL 2 TIMES DAILY PRN
Status: DISCONTINUED | OUTPATIENT
Start: 2023-06-11 | End: 2023-06-17 | Stop reason: HOSPADM

## 2023-06-11 RX ADMIN — ATORVASTATIN CALCIUM 40 MG: 20 TABLET, FILM COATED ORAL at 08:06

## 2023-06-11 RX ADMIN — METHYLPREDNISOLONE SODIUM SUCCINATE 125 MG: 125 INJECTION, POWDER, FOR SOLUTION INTRAMUSCULAR; INTRAVENOUS at 05:06

## 2023-06-11 RX ADMIN — IPRATROPIUM BROMIDE AND ALBUTEROL SULFATE 3 ML: .5; 3 SOLUTION RESPIRATORY (INHALATION) at 08:06

## 2023-06-11 RX ADMIN — GABAPENTIN 100 MG: 100 CAPSULE ORAL at 02:06

## 2023-06-11 RX ADMIN — Medication 10 ML: at 02:06

## 2023-06-11 RX ADMIN — RISPERIDONE 1 MG: 1 TABLET ORAL at 09:06

## 2023-06-11 RX ADMIN — LEVOFLOXACIN 750 MG: 750 INJECTION, SOLUTION INTRAVENOUS at 07:06

## 2023-06-11 RX ADMIN — MIRTAZAPINE 15 MG: 15 TABLET, FILM COATED ORAL at 09:06

## 2023-06-11 RX ADMIN — IPRATROPIUM BROMIDE AND ALBUTEROL SULFATE 3 ML: .5; 3 SOLUTION RESPIRATORY (INHALATION) at 01:06

## 2023-06-11 RX ADMIN — TIOTROPIUM BROMIDE INHALATION SPRAY 2 PUFF: 3.12 SPRAY, METERED RESPIRATORY (INHALATION) at 09:06

## 2023-06-11 RX ADMIN — ACETAMINOPHEN 650 MG: 325 TABLET, FILM COATED ORAL at 01:06

## 2023-06-11 RX ADMIN — FLUTICASONE FUROATE AND VILANTEROL TRIFENATATE 1 PUFF: 200; 25 POWDER RESPIRATORY (INHALATION) at 09:06

## 2023-06-11 RX ADMIN — GABAPENTIN 100 MG: 100 CAPSULE ORAL at 08:06

## 2023-06-11 RX ADMIN — Medication 6 MG: at 01:06

## 2023-06-11 RX ADMIN — RISPERIDONE 1 MG: 1 TABLET ORAL at 08:06

## 2023-06-11 RX ADMIN — IPRATROPIUM BROMIDE AND ALBUTEROL SULFATE 3 ML: .5; 3 SOLUTION RESPIRATORY (INHALATION) at 11:06

## 2023-06-11 RX ADMIN — METHYLPREDNISOLONE SODIUM SUCCINATE 125 MG: 125 INJECTION, POWDER, FOR SOLUTION INTRAMUSCULAR; INTRAVENOUS at 11:06

## 2023-06-11 RX ADMIN — SUCRALFATE 1 G: 1 TABLET ORAL at 12:06

## 2023-06-11 RX ADMIN — AMLODIPINE BESYLATE 10 MG: 5 TABLET ORAL at 08:06

## 2023-06-11 RX ADMIN — Medication 10 ML: at 09:06

## 2023-06-11 RX ADMIN — GABAPENTIN 100 MG: 100 CAPSULE ORAL at 09:06

## 2023-06-11 RX ADMIN — HEPARIN SODIUM 5000 UNITS: 5000 INJECTION INTRAVENOUS; SUBCUTANEOUS at 09:06

## 2023-06-11 RX ADMIN — LOSARTAN POTASSIUM 100 MG: 50 TABLET, FILM COATED ORAL at 08:06

## 2023-06-11 RX ADMIN — IPRATROPIUM BROMIDE AND ALBUTEROL SULFATE 3 ML: .5; 3 SOLUTION RESPIRATORY (INHALATION) at 12:06

## 2023-06-11 RX ADMIN — SUCRALFATE 1 G: 1 TABLET ORAL at 09:06

## 2023-06-11 RX ADMIN — IPRATROPIUM BROMIDE AND ALBUTEROL SULFATE 3 ML: .5; 3 SOLUTION RESPIRATORY (INHALATION) at 09:06

## 2023-06-11 RX ADMIN — Medication 6 MG: at 09:06

## 2023-06-11 RX ADMIN — Medication 10 ML: at 05:06

## 2023-06-11 RX ADMIN — METHYLPREDNISOLONE SODIUM SUCCINATE 125 MG: 125 INJECTION, POWDER, FOR SOLUTION INTRAMUSCULAR; INTRAVENOUS at 07:06

## 2023-06-11 RX ADMIN — IPRATROPIUM BROMIDE AND ALBUTEROL SULFATE 3 ML: .5; 3 SOLUTION RESPIRATORY (INHALATION) at 05:06

## 2023-06-11 RX ADMIN — SUCRALFATE 1 G: 1 TABLET ORAL at 04:06

## 2023-06-11 RX ADMIN — ACETAMINOPHEN 650 MG: 325 TABLET, FILM COATED ORAL at 04:06

## 2023-06-11 RX ADMIN — HEPARIN SODIUM 5000 UNITS: 5000 INJECTION INTRAVENOUS; SUBCUTANEOUS at 02:06

## 2023-06-11 RX ADMIN — SUCRALFATE 1 G: 1 TABLET ORAL at 08:06

## 2023-06-11 RX ADMIN — TAMSULOSIN HYDROCHLORIDE 0.4 MG: 0.4 CAPSULE ORAL at 08:06

## 2023-06-11 NOTE — SUBJECTIVE & OBJECTIVE
"Past Medical History:   Diagnosis Date    Anxiety     Asthma     Bronchitis     COPD (chronic obstructive pulmonary disease)     Depression     GERD (gastroesophageal reflux disease)     Hypertension     Schizophrenia        Past Surgical History:   Procedure Laterality Date    HERNIA REPAIR      none         Review of patient's allergies indicates:   Allergen Reactions    Aspirin Anaphylaxis    Dilaudid [hydromorphone (bulk)] Other (See Comments)     Pt states dilaudid "makes me paranoid - real spooked". Denies s/s anaphylaxis.     Shellfish containing products Hives       No current facility-administered medications on file prior to encounter.     Current Outpatient Medications on File Prior to Encounter   Medication Sig    albuterol 90 mcg/actuation inhaler Inhale 2 puffs into the lungs every 4 (four) hours as needed for Wheezing or Shortness of Breath.    albuterol-ipratropium 2.5mg-0.5mg/3mL (DUO-NEB) 0.5 mg-3 mg(2.5 mg base)/3 mL nebulizer solution Take 3 mLs by nebulization every 4 (four) hours as needed for Wheezing or Shortness of Breath.    amlodipine (NORVASC) 10 MG tablet Take 1 tablet (10 mg total) by mouth once daily.    atorvastatin (LIPITOR) 40 MG tablet Take 40 mg by mouth once daily.    clonazePAM (KLONOPIN) 0.5 MG tablet Take 0.5 mg by mouth 2 (two) times daily as needed for Anxiety.    famotidine (PEPCID AC) 20 MG tablet Take 1 tablet (20 mg total) by mouth 2 (two) times daily.    fluticasone-vilanterol (BREO) 100-25 mcg/dose diskus inhaler Inhale 1 puff into the lungs once daily.    gabapentin (NEURONTIN) 100 MG capsule Take 100 mg by mouth 3 (three) times daily.    losartan (COZAAR) 100 MG tablet Take 100 mg by mouth once daily.    meclizine (ANTIVERT) 25 mg tablet Take 25 mg by mouth 3 (three) times daily as needed.    montelukast (SINGULAIR) 10 mg tablet Take 10 mg by mouth every evening.    sucralfate (CARAFATE) 1 gram tablet Take 1 g by mouth 4 (four) times daily.    terbinafine HCl " (LAMISIL) 250 mg tablet Take 250 mg by mouth once daily.    tiotropium (SPIRIVA) 18 mcg inhalation capsule Inhale 1 capsule (18 mcg total) into the lungs once daily.    tramadol (ULTRAM) 50 mg tablet Take 1 tablet (50 mg total) by mouth 2 (two) times daily as needed for Pain.     Family History       Problem Relation (Age of Onset)    Cancer Father    Diabetes Father    Hypertension Mother, Father    Pneumonia Mother          Tobacco Use    Smoking status: Former     Packs/day: 3.00     Types: Cigarettes     Quit date: 2016     Years since quittin.9    Smokeless tobacco: Never    Tobacco comments:     Smoked up to 3 ppd for 33 years.   Substance and Sexual Activity    Alcohol use: Yes     Comment: occassional    Drug use: No    Sexual activity: Never     Review of Systems   Constitutional:  Positive for chills, diaphoresis and fever. Negative for activity change, appetite change and fatigue.   HENT:  Negative for congestion, ear pain, postnasal drip, rhinorrhea, sinus pressure, sore throat and trouble swallowing.    Respiratory:  Positive for cough, chest tightness and shortness of breath. Negative for wheezing.    Cardiovascular:  Negative for chest pain, palpitations and leg swelling.   Gastrointestinal:  Negative for abdominal distention, abdominal pain, constipation, diarrhea, nausea and vomiting.   Genitourinary:  Negative for dysuria, flank pain, frequency, hematuria and urgency.   Musculoskeletal:  Negative for arthralgias, back pain, joint swelling, neck pain and neck stiffness.   Skin:  Negative for color change and pallor.   Neurological:  Negative for dizziness, syncope, weakness, light-headedness and headaches.   Psychiatric/Behavioral:  Negative for agitation and confusion.    Objective:     Vital Signs (Most Recent):  Temp: 97.8 °F (36.6 °C) (23)  Pulse: 65 (23 0600)  Resp: 16 (23)  BP: (!) 161/100 (23)  SpO2: 96 % (23) Vital Signs (24h  Range):  Temp:  [97.5 °F (36.4 °C)-98.2 °F (36.8 °C)] 97.8 °F (36.6 °C)  Pulse:  [] 65  Resp:  [10-26] 16  SpO2:  [92 %-99 %] 96 %  BP: ()/() 161/100     Weight: 98.1 kg (216 lb 4.3 oz)  Body mass index is 34.91 kg/m².     Physical Exam  Vitals and nursing note reviewed.   Constitutional:       General: She is in acute distress.      Appearance: She is well-developed. She is obese. She is not ill-appearing, toxic-appearing or diaphoretic.   HENT:      Head: Normocephalic and atraumatic.   Eyes:      General: No scleral icterus.        Right eye: No discharge.         Left eye: No discharge.      Conjunctiva/sclera: Conjunctivae normal.   Neck:      Trachea: No tracheal deviation.   Cardiovascular:      Rate and Rhythm: Normal rate and regular rhythm.      Heart sounds: Normal heart sounds. No murmur heard.    No gallop.   Pulmonary:      Effort: Respiratory distress (mild) present.      Breath sounds: Decreased air movement present. No stridor. Wheezing present. No rales.   Abdominal:      General: Bowel sounds are normal. There is no distension.      Palpations: Abdomen is soft. There is no mass.      Tenderness: There is no abdominal tenderness. There is no guarding.   Musculoskeletal:         General: No deformity. Normal range of motion.      Cervical back: Normal range of motion and neck supple.   Skin:     General: Skin is warm and dry.      Coloration: Skin is not pale.      Findings: No erythema or rash.   Neurological:      General: No focal deficit present.      Mental Status: She is alert and oriented to person, place, and time.      Cranial Nerves: No cranial nerve deficit.      Motor: No abnormal muscle tone.   Psychiatric:         Mood and Affect: Mood normal.         Behavior: Behavior normal.         Thought Content: Thought content normal.         Judgment: Judgment normal.              Significant Labs: All pertinent labs within the past 24 hours have been reviewed.  BMP:   Recent  Labs   Lab 06/11/23  0424   *      K 4.3      CO2 19*   BUN 17   CREATININE 0.9   CALCIUM 9.5   MG 1.6     CBC:   Recent Labs   Lab 06/10/23  1902 06/10/23  1925 06/11/23  0424   WBC 5.13  --  3.75*   HGB 11.2*  --  10.9*   HCT 35.3* 41 34.9*     --  294     CMP:   Recent Labs   Lab 06/10/23  1902 06/11/23  0424    139   K 3.3* 4.3    108   CO2 20* 19*   * 186*   BUN 16 17   CREATININE 1.3 0.9   CALCIUM 9.4 9.5   ANIONGAP 16 12     Urine Culture: No results for input(s): LABURIN in the last 48 hours.  Urine Studies: No results for input(s): COLORU, APPEARANCEUA, PHUR, SPECGRAV, PROTEINUA, GLUCUA, KETONESU, BILIRUBINUA, OCCULTUA, NITRITE, UROBILINOGEN, LEUKOCYTESUR, RBCUA, WBCUA, BACTERIA, SQUAMEPITHEL, HYALINECASTS in the last 48 hours.    Invalid input(s): WRIGHTSUR    Significant Imaging: I have reviewed all pertinent imaging results/findings within the past 24 hours.  Imaging Results              X-Ray Chest AP Portable (Final result)  Result time 06/10/23 19:43:38      Final result by Edgard Jarrett MD (06/10/23 19:43:38)                   Impression:      No acute process.      Electronically signed by: Edgard Jarrett MD  Date:    06/10/2023  Time:    19:43               Narrative:    EXAMINATION:  XR CHEST AP PORTABLE    CLINICAL HISTORY:  Dyspnea, unspecified    TECHNIQUE:  Single frontal view of the chest was performed.    COMPARISON:  09/04/2017.    FINDINGS:  Monitoring EKG leads are present.  The trachea is unremarkable.  The cardiomediastinal silhouette is within normal limits.  The hilar structures are unremarkable.  There is no evidence of free air beneath hemidiaphragms.  There are no pleural effusions.  There is no evidence of a pneumothorax.  There is no evidence of pneumomediastinum.  No airspace opacity is present.  The osseous structures are unremarkable.

## 2023-06-11 NOTE — NURSING
2330:Patient arrived to unit via wheelchair per transportation in stable condition. AAOx4. Respirations even and unlabored. Patient ambulated to BR. Dyspnea on exertion noted. O2 sat 95% RA. Expiratory wheezing noted in all lung fields. PRN O2/2L/NC applied for comfort. Oriented patient to room and call bell. Bed locked in lowest position. Side rails up x2. Call bell within reach.

## 2023-06-11 NOTE — PROGRESS NOTES
Pt is on 2 L nasal cannula with SATs of 96%. Aerosol and Mdi treatments given with no adverse reaction.

## 2023-06-11 NOTE — PLAN OF CARE
Problem: Adult Inpatient Plan of Care  Goal: Plan of Care Review  Outcome: Ongoing, Progressing  Goal: Patient-Specific Goal (Individualized)  Outcome: Ongoing, Progressing  Goal: Absence of Hospital-Acquired Illness or Injury  Outcome: Ongoing, Progressing  Goal: Optimal Comfort and Wellbeing  Outcome: Ongoing, Progressing  Goal: Readiness for Transition of Care  Outcome: Ongoing, Progressing     Problem: Infection (Pneumonia)  Goal: Resolution of Infection Signs and Symptoms  Outcome: Ongoing, Progressing     Problem: Adjustment to Illness COPD (Chronic Obstructive Pulmonary Disease)  Goal: Optimal Chronic Illness Coping  Outcome: Ongoing, Progressing     Problem: Oral Intake Inadequate COPD (Chronic Obstructive Pulmonary Disease)  Goal: Improved Nutrition Intake  Outcome: Ongoing, Progressing     Pt alert and oriented. Afebrile. Oxygen maintained @ room air. IV abt continued. Solu-Medrol continued. Resp treatments ordered. Good oral intake. Telemetry monitoring continued. Tachycardia. Safety maintained.

## 2023-06-11 NOTE — ASSESSMENT & PLAN NOTE
- chronic   - hypertensive upon admission   - home meds: amlodipine 10 mg QD, losartan 100 mg QD  - monitor

## 2023-06-11 NOTE — PLAN OF CARE
Problem: Adult Inpatient Plan of Care  Goal: Plan of Care Review  Outcome: Ongoing, Progressing  Goal: Patient-Specific Goal (Individualized)  Outcome: Ongoing, Progressing  Goal: Absence of Hospital-Acquired Illness or Injury  Outcome: Ongoing, Progressing  Goal: Optimal Comfort and Wellbeing  Outcome: Ongoing, Progressing     Problem: Fluid Imbalance (Pneumonia)  Goal: Fluid Balance  Outcome: Ongoing, Progressing     Problem: Adjustment to Illness COPD (Chronic Obstructive Pulmonary Disease)  Goal: Optimal Chronic Illness Coping  Outcome: Ongoing, Progressing     Problem: Infection COPD (Chronic Obstructive Pulmonary Disease)  Goal: Absence of Infection Signs and Symptoms  Outcome: Ongoing, Progressing     POC reviewed with patient. All questions and concerns addressed. Fall/safety precautions implemented and maintained. Pain management provided. PRN breathing tx administered per RT. SOB noted while ambulating. Please see flowsheet for full assessment and vitals. Bed locked in lowest position. Side rails up x2. Call bell within reach. Will continue to monitor.

## 2023-06-11 NOTE — ED TRIAGE NOTES
Pt with hx of COPD and asthma arrived by EMS with c/o SOB and CP x a few days, states inhaler was not helping her at home.  EMS states pt walked up to them and requested a ride to the ER.  Pt was given 1 duo-neb tx with some improvement.  EMS reports pt's initial SpO2 was 95% on RA, however pt was tachypneic.  Pt able to answer questions.  Audible wheezing and prolonged expiration noted.  Copious amounts of green sputum expectorated.  Aao x 4.

## 2023-06-11 NOTE — ASSESSMENT & PLAN NOTE
- Ms. Talia Mueller presents with acute respiratory failure 2/2 COPD vs. Asthma exacerbation   - Patient with Hypoxic Respiratory failure which is Acute.  she is not on home oxygen.   - Signs/symptoms of respiratory failure include- tachypnea and increased work of breathing. Contributing diagnoses includes - COPD and Obesity Hypoventilation Labs and images were reviewed. Patient Has not had a recent ABG. Will treat underlying causes and adjust management of respiratory failure.   - COPD Exacerbation pathways initiated    - starting antibiotics given increased cough, increased sputum and change in sputum color    - will administer steroids IV [id number] due to mild respiratory distress and recent antibiotic use (1 month ago)   - scheduled and PRN DuoNebs ordered   - steroids ordered

## 2023-06-11 NOTE — ED NOTES
Pt ambulated with assist x 2 too, experience some sob and weakness with O2 sat dropping to 87% on room air. MD made aware, pt guided safely back to the room

## 2023-06-11 NOTE — HPI
"From H&P by Dalila MASON:  "Ms. Talia Mueller is a 58 y.o. female, with PMH of COPD, asthma, obesity, GEOVANNI, anemia, who presented to [facility] ED on 6/10/23 due to shortness of breath x 4 days. She tried using her albuterol without relief. She denied fever. EMS was called to bring her to the hospital. Upon arrival the was mildly hypotensive. She was treated with 1L IV fluids, and is now hypertensive, but her O2 sats have dropped. She was treated with DuoNebs, and steroids without significant improvement."  "

## 2023-06-11 NOTE — H&P
"[facility]  History & Physical    Patient Name: Talia Mueller  MRN: [id number]  Patient Class: OP- Observation  Admission Date: 6/10/2023  Attending Physician: Miguel Villanueva MD   Primary Care Provider: Camilla Lomas MD         Patient information was obtained from patient, past medical records and ER records.     Subjective:     Principal Problem:Acute hypoxemic respiratory failure    Chief Complaint:   Chief Complaint   Patient presents with    Shortness of Breath     Per EMS SOB X4 days, hx COPD and asthma, took albuterol with no relief.        HPI: Ms. Talia Mueller is a 58 y.o. female, with PMH of COPD, asthma, obesity, GEOVANNI, anemia, who presented to [facility] ED on 6/10/23 due to shortness of breath x 4 days. She tried using her albuterol without relief. She denied fever. EMS was called to bring her to the hospital. Upon arrival the was mildly hypotensive. She was treated with 1L IV fluids, and is now hypertensive, but her O2 sats have dropped. She was treated with DuoNebs, and steroids without significant improvement. She was placed on observation.       Past Medical History:   Diagnosis Date    Anxiety     Asthma     Bronchitis     COPD (chronic obstructive pulmonary disease)     Depression     GERD (gastroesophageal reflux disease)     Hypertension     Schizophrenia        Past Surgical History:   Procedure Laterality Date    HERNIA REPAIR      none         Review of patient's allergies indicates:   Allergen Reactions    Aspirin Anaphylaxis    Dilaudid [hydromorphone (bulk)] Other (See Comments)     Pt states dilaudid "makes me paranoid - real spooked". Denies s/s anaphylaxis.     Shellfish containing products Hives       No current facility-administered medications on file prior to encounter.     Current Outpatient Medications on File Prior to Encounter   Medication Sig    albuterol 90 mcg/actuation inhaler Inhale 2 puffs into the lungs every 4 (four) " hours as needed for Wheezing or Shortness of Breath.    albuterol-ipratropium 2.5mg-0.5mg/3mL (DUO-NEB) 0.5 mg-3 mg(2.5 mg base)/3 mL nebulizer solution Take 3 mLs by nebulization every 4 (four) hours as needed for Wheezing or Shortness of Breath.    amlodipine (NORVASC) 10 MG tablet Take 1 tablet (10 mg total) by mouth once daily.    atorvastatin (LIPITOR) 40 MG tablet Take 40 mg by mouth once daily.    clonazePAM (KLONOPIN) 0.5 MG tablet Take 0.5 mg by mouth 2 (two) times daily as needed for Anxiety.    famotidine (PEPCID AC) 20 MG tablet Take 1 tablet (20 mg total) by mouth 2 (two) times daily.    fluticasone-vilanterol (BREO) 100-25 mcg/dose diskus inhaler Inhale 1 puff into the lungs once daily.    gabapentin (NEURONTIN) 100 MG capsule Take 100 mg by mouth 3 (three) times daily.    losartan (COZAAR) 100 MG tablet Take 100 mg by mouth once daily.    meclizine (ANTIVERT) 25 mg tablet Take 25 mg by mouth 3 (three) times daily as needed.    montelukast (SINGULAIR) 10 mg tablet Take 10 mg by mouth every evening.    sucralfate (CARAFATE) 1 gram tablet Take 1 g by mouth 4 (four) times daily.    terbinafine HCl (LAMISIL) 250 mg tablet Take 250 mg by mouth once daily.    tiotropium (SPIRIVA) 18 mcg inhalation capsule Inhale 1 capsule (18 mcg total) into the lungs once daily.    tramadol (ULTRAM) 50 mg tablet Take 1 tablet (50 mg total) by mouth 2 (two) times daily as needed for Pain.     Family History       Problem Relation (Age of Onset)    Cancer Father    Diabetes Father    Hypertension Mother, Father    Pneumonia Mother          Tobacco Use    Smoking status: Former     Packs/day: 3.00     Types: Cigarettes     Quit date: 2016     Years since quittin.9    Smokeless tobacco: Never    Tobacco comments:     Smoked up to 3 ppd for 33 years.   Substance and Sexual Activity    Alcohol use: Yes     Comment: occassional    Drug use: No    Sexual activity: Never     Review of Systems    Constitutional:  Positive for chills, diaphoresis and fever. Negative for activity change, appetite change and fatigue.   HENT:  Negative for congestion, ear pain, postnasal drip, rhinorrhea, sinus pressure, sore throat and trouble swallowing.    Respiratory:  Positive for cough, chest tightness and shortness of breath. Negative for wheezing.    Cardiovascular:  Negative for chest pain, palpitations and leg swelling.   Gastrointestinal:  Negative for abdominal distention, abdominal pain, constipation, diarrhea, nausea and vomiting.   Genitourinary:  Negative for dysuria, flank pain, frequency, hematuria and urgency.   Musculoskeletal:  Negative for arthralgias, back pain, joint swelling, neck pain and neck stiffness.   Skin:  Negative for color change and pallor.   Neurological:  Negative for dizziness, syncope, weakness, light-headedness and headaches.   Psychiatric/Behavioral:  Negative for agitation and confusion.    Objective:     Vital Signs (Most Recent):  Temp: 97.8 °F (36.6 °C) (06/11/23 0338)  Pulse: 65 (06/11/23 0600)  Resp: 16 (06/11/23 0338)  BP: (!) 161/100 (06/11/23 0338)  SpO2: 96 % (06/11/23 0338) Vital Signs (24h Range):  Temp:  [97.5 °F (36.4 °C)-98.2 °F (36.8 °C)] 97.8 °F (36.6 °C)  Pulse:  [] 65  Resp:  [10-26] 16  SpO2:  [92 %-99 %] 96 %  BP: ()/() 161/100     Weight: 98.1 kg (216 lb 4.3 oz)  Body mass index is 34.91 kg/m².     Physical Exam  Vitals and nursing note reviewed.   Constitutional:       General: She is in acute distress.      Appearance: She is well-developed. She is obese. She is not ill-appearing, toxic-appearing or diaphoretic.   HENT:      Head: Normocephalic and atraumatic.   Eyes:      General: No scleral icterus.        Right eye: No discharge.         Left eye: No discharge.      Conjunctiva/sclera: Conjunctivae normal.   Neck:      Trachea: No tracheal deviation.   Cardiovascular:      Rate and Rhythm: Normal rate and regular rhythm.      Heart sounds:  Normal heart sounds. No murmur heard.    No gallop.   Pulmonary:      Effort: Respiratory distress (mild) present.      Breath sounds: Decreased air movement present. No stridor. Wheezing present. No rales.   Abdominal:      General: Bowel sounds are normal. There is no distension.      Palpations: Abdomen is soft. There is no mass.      Tenderness: There is no abdominal tenderness. There is no guarding.   Musculoskeletal:         General: No deformity. Normal range of motion.      Cervical back: Normal range of motion and neck supple.   Skin:     General: Skin is warm and dry.      Coloration: Skin is not pale.      Findings: No erythema or rash.   Neurological:      General: No focal deficit present.      Mental Status: She is alert and oriented to person, place, and time.      Cranial Nerves: No cranial nerve deficit.      Motor: No abnormal muscle tone.   Psychiatric:         Mood and Affect: Mood normal.         Behavior: Behavior normal.         Thought Content: Thought content normal.         Judgment: Judgment normal.              Significant Labs: All pertinent labs within the past 24 hours have been reviewed.  BMP:   Recent Labs   Lab 06/11/23  0424   *      K 4.3      CO2 19*   BUN 17   CREATININE 0.9   CALCIUM 9.5   MG 1.6     CBC:   Recent Labs   Lab 06/10/23  1902 06/10/23  1925 06/11/23  0424   WBC 5.13  --  3.75*   HGB 11.2*  --  10.9*   HCT 35.3* 41 34.9*     --  294     CMP:   Recent Labs   Lab 06/10/23  1902 06/11/23  0424    139   K 3.3* 4.3    108   CO2 20* 19*   * 186*   BUN 16 17   CREATININE 1.3 0.9   CALCIUM 9.4 9.5   ANIONGAP 16 12     Urine Culture: No results for input(s): LABURIN in the last 48 hours.  Urine Studies: No results for input(s): COLORU, APPEARANCEUA, PHUR, SPECGRAV, PROTEINUA, GLUCUA, KETONESU, BILIRUBINUA, OCCULTUA, NITRITE, UROBILINOGEN, LEUKOCYTESUR, RBCUA, WBCUA, BACTERIA, SQUAMEPITHEL, HYALINECASTS in the last 48  hours.    Invalid input(s): JOSEPH    Significant Imaging: I have reviewed all pertinent imaging results/findings within the past 24 hours.  Imaging Results              X-Ray Chest AP Portable (Final result)  Result time 06/10/23 19:43:38      Final result by Edgard Jarrett MD (06/10/23 19:43:38)                   Impression:      No acute process.      Electronically signed by: Edgard Jarrett MD  Date:    06/10/2023  Time:    19:43               Narrative:    EXAMINATION:  XR CHEST AP PORTABLE    CLINICAL HISTORY:  Dyspnea, unspecified    TECHNIQUE:  Single frontal view of the chest was performed.    COMPARISON:  09/04/2017.    FINDINGS:  Monitoring EKG leads are present.  The trachea is unremarkable.  The cardiomediastinal silhouette is within normal limits.  The hilar structures are unremarkable.  There is no evidence of free air beneath hemidiaphragms.  There are no pleural effusions.  There is no evidence of a pneumothorax.  There is no evidence of pneumomediastinum.  No airspace opacity is present.  The osseous structures are unremarkable.                                       Assessment/Plan:     * Acute hypoxemic respiratory failure  - Ms. Talia Mueller presents with acute respiratory failure 2/2 COPD vs. Asthma exacerbation   - Patient with Hypoxic Respiratory failure which is Acute.  she is not on home oxygen.   - Signs/symptoms of respiratory failure include- tachypnea and increased work of breathing. Contributing diagnoses includes - COPD and Obesity Hypoventilation Labs and images were reviewed. Patient Has not had a recent ABG. Will treat underlying causes and adjust management of respiratory failure.   - COPD Exacerbation pathways initiated    - starting antibiotics given increased cough, increased sputum and change in sputum color    - will administer steroids IV [id number] due to mild respiratory distress and recent antibiotic use (1 month ago)   - scheduled and PRN DuoNebs ordered   - steroids  "ordered     Essential hypertension  - chronic   - hypertensive upon admission   - home meds: amlodipine 10 mg QD, losartan 100 mg QD  - monitor     Anemia  - H&H stable  - monitor      GEOVANNI (obstructive sleep apnea)  - CPAP ordered       COPD exacerbation  As above in "acute hypoxemic respiratory failure"       Asthmatic bronchitis with acute exacerbation  As above in "acute hypoxemic respiratory failure"       VTE Risk Mitigation (From admission, onward)         Ordered     heparin (porcine) injection 5,000 Units  Every 8 hours         06/11/23 0627     IP VTE HIGH RISK PATIENT  Once         06/11/23 0627     Place sequential compression device  Until discontinued         06/11/23 0123                     On 06/11/2023, patient should be placed in hospital observation services under the care of Dr. Miguel Villanueva MD.      SPENCER PandaC  Department of Hospital Medicine  [facility] ([place])  "

## 2023-06-11 NOTE — PLAN OF CARE
Daughter staying with patient to assist with ADLs as needed - dependent on DME - has home oxygen - active with [facility] - will need lancets for glucometer - will need transportation home with oxygen    [facility] ([place])  Initial Discharge Assessment       Primary Care Provider: Camilla Lomas MD    Admission Diagnosis: Dyspnea [R06.00]  COPD exacerbation [J44.1]    Admission Date: 6/10/2023  Expected Discharge Date:     Transition of Care Barriers: None    Payor: MEDICAID / Plan: Commutable [facility] ([employer]) / Product Type: Managed Medicaid /     Extended Emergency Contact Information  Primary Emergency Contact: Deisi Carranza   [place]  Mobile Phone: [phone]  Relation: Daughter  Secondary Emergency Contact: Gemma Mueller  Address: [address] [place] [address] [id number] [place] [address]  Home Phone: [phone]  Relation: Relative    Discharge Plan A: Home Health         [id number] - [place], [place] - [address] [place] [address]  Phone: [phone] Fax: [phone]      Initial Assessment (most recent)       Adult Discharge Assessment - 06/11/23 0847          Discharge Assessment    Assessment Type Discharge Planning Assessment     Confirmed/corrected address, phone number and insurance Yes     Confirmed Demographics --   corrected in EPIC    Source of Information patient     Does patient/caregiver understand observation status Yes     People in Home child(radha), adult     Do you expect to return to your current living situation? Yes     Do you have help at home or someone to help you manage your care at home? Yes     Prior to hospitilization cognitive status: Alert/Oriented     Current cognitive status: Alert/Oriented     Walking or Climbing Stairs ambulation difficulty, requires equipment;transferring difficulty, requires equipment      Dressing/Bathing bathing difficulty, requires equipment;bathing difficulty, assistance 1 person     Equipment Currently Used at Home rollator;shower chair;glucometer;oxygen;nebulizer;CPAP     Do you currently have service(s) that help you manage your care at home? Yes     Name and Contact number of agency [facility]     Is the pt/caregiver preference to resume services with current agency Yes     Do you take prescription medications? Yes     Do you have prescription coverage? Yes     Do you have any problems affording any of your prescribed medications? No     Is the patient taking medications as prescribed? yes     Who is going to help you get home at discharge? needs ride     How do you get to doctors appointments? health plan transportation     Are you on dialysis? No     Discharge Plan A Home Health     DME Needed Upon Discharge  none     Discharge Plan discussed with: Patient     Transition of Care Barriers None        Physical Activity    On average, how many days per week do you engage in moderate to strenuous exercise (like a brisk walk)? 0 days     On average, how many minutes do you engage in exercise at this level? 0 min        Financial Resource Strain    How hard is it for you to pay for the very basics like food, housing, medical care, and heating? Somewhat hard        Housing Stability    In the last 12 months, was there a time when you were not able to pay the mortgage or rent on time? No     In the last 12 months, how many places have you lived? 2     In the last 12 months, was there a time when you did not have a steady place to sleep or slept in a shelter (including now)? No        Transportation Needs    In the past 12 months, has lack of transportation kept you from medical appointments or from getting medications? No     In the past 12 months, has lack of transportation kept you from meetings, work, or from getting things needed for daily living? Yes        Food Insecurity    Within  the past 12 months, you worried that your food would run out before you got the money to buy more. Sometimes true     Within the past 12 months, the food you bought just didn't last and you didn't have money to get more. Never true        Stress    Do you feel stress - tense, restless, nervous, or anxious, or unable to sleep at night because your mind is troubled all the time - these days? Very much        Social Connections    In a typical week, how many times do you talk on the phone with family, friends, or neighbors? More than three times a week     How often do you get together with friends or relatives? More than three times a week     How often do you attend Oriental orthodox or Scientology services? 1 to 4 times per year     Do you belong to any clubs or organizations such as Oriental orthodox groups, unions, fraternal or athletic groups, or school groups? No     How often do you attend meetings of the clubs or organizations you belong to? Never     Are you , , , , never , or living with a partner? Never         Alcohol Use    Q1: How often do you have a drink containing alcohol? 2-3 times a week     Q2: How many drinks containing alcohol do you have on a typical day when you are drinking? 3 or 4     Q3: How often do you have six or more drinks on one occasion? Monthly

## 2023-06-12 LAB
ANION GAP SERPL CALC-SCNC: 12 MMOL/L (ref 8–16)
AV INDEX (PROSTH): 0.65
AV MEAN GRADIENT: 7 MMHG
AV PEAK GRADIENT: 13 MMHG
AV VALVE AREA: 2.17 CM2
AV VELOCITY RATIO: 0.6
BASOPHILS # BLD AUTO: 0.01 K/UL (ref 0–0.2)
BASOPHILS NFR BLD: 0.1 % (ref 0–1.9)
BSA FOR ECHO PROCEDURE: 2.14 M2
BUN SERPL-MCNC: 22 MG/DL (ref 6–20)
CALCIUM SERPL-MCNC: 10.5 MG/DL (ref 8.7–10.5)
CHLORIDE SERPL-SCNC: 106 MMOL/L (ref 95–110)
CO2 SERPL-SCNC: 21 MMOL/L (ref 23–29)
CREAT SERPL-MCNC: 0.8 MG/DL (ref 0.5–1.4)
CV ECHO LV RWT: 0.33 CM
DIFFERENTIAL METHOD: ABNORMAL
DOP CALC AO PEAK VEL: 1.83 M/S
DOP CALC AO VTI: 42 CM
DOP CALC LVOT AREA: 3.4 CM2
DOP CALC LVOT DIAMETER: 2.07 CM
DOP CALC LVOT PEAK VEL: 1.09 M/S
DOP CALC LVOT STROKE VOLUME: 91.15 CM3
DOP CALCLVOT PEAK VEL VTI: 27.1 CM
E WAVE DECELERATION TIME: 153.33 MSEC
E/A RATIO: 0.77
E/E' RATIO: 13.23 M/S
ECHO LV POSTERIOR WALL: 0.8 CM (ref 0.6–1.1)
EJECTION FRACTION: 65 %
EOSINOPHIL # BLD AUTO: 0 K/UL (ref 0–0.5)
EOSINOPHIL NFR BLD: 0 % (ref 0–8)
ERYTHROCYTE [DISTWIDTH] IN BLOOD BY AUTOMATED COUNT: 16.9 % (ref 11.5–14.5)
EST. GFR  (NO RACE VARIABLE): >60 ML/MIN/1.73 M^2
FRACTIONAL SHORTENING: 38 % (ref 28–44)
GLUCOSE SERPL-MCNC: 176 MG/DL (ref 70–110)
HCT VFR BLD AUTO: 36.3 % (ref 37–48.5)
HGB BLD-MCNC: 11.3 G/DL (ref 12–16)
IMM GRANULOCYTES # BLD AUTO: 0.17 K/UL (ref 0–0.04)
IMM GRANULOCYTES NFR BLD AUTO: 2.1 % (ref 0–0.5)
INTERVENTRICULAR SEPTUM: 0.79 CM (ref 0.6–1.1)
IVC DIAMETER: 1.14 CM
IVRT: 65.65 MSEC
LA MAJOR: 4.78 CM
LA MINOR: 5.7 CM
LA WIDTH: 4.1 CM
LEFT ATRIUM SIZE: 3.83 CM
LEFT ATRIUM VOLUME INDEX MOD: 38.2 ML/M2
LEFT ATRIUM VOLUME INDEX: 33.5 ML/M2
LEFT ATRIUM VOLUME MOD: 79 CM3
LEFT ATRIUM VOLUME: 69.4 CM3
LEFT INTERNAL DIMENSION IN SYSTOLE: 2.96 CM (ref 2.1–4)
LEFT VENTRICLE DIASTOLIC VOLUME INDEX: 51.6 ML/M2
LEFT VENTRICLE DIASTOLIC VOLUME: 106.82 ML
LEFT VENTRICLE MASS INDEX: 60 G/M2
LEFT VENTRICLE SYSTOLIC VOLUME INDEX: 16.3 ML/M2
LEFT VENTRICLE SYSTOLIC VOLUME: 33.75 ML
LEFT VENTRICULAR INTERNAL DIMENSION IN DIASTOLE: 4.79 CM (ref 3.5–6)
LEFT VENTRICULAR MASS: 125.23 G
LV LATERAL E/E' RATIO: 12.29 M/S
LV SEPTAL E/E' RATIO: 14.33 M/S
LVOT MG: 3.14 MMHG
LVOT MV: 0.85 CM/S
LYMPHOCYTES # BLD AUTO: 0.7 K/UL (ref 1–4.8)
LYMPHOCYTES NFR BLD: 8.2 % (ref 18–48)
MAGNESIUM SERPL-MCNC: 1.9 MG/DL (ref 1.6–2.6)
MCH RBC QN AUTO: 25.2 PG (ref 27–31)
MCHC RBC AUTO-ENTMCNC: 31.1 G/DL (ref 32–36)
MCV RBC AUTO: 81 FL (ref 82–98)
MONOCYTES # BLD AUTO: 0.2 K/UL (ref 0.3–1)
MONOCYTES NFR BLD: 1.9 % (ref 4–15)
MV PEAK A VEL: 1.11 M/S
MV PEAK E VEL: 0.86 M/S
MV STENOSIS PRESSURE HALF TIME: 44.47 MS
MV VALVE AREA P 1/2 METHOD: 4.95 CM2
NEUTROPHILS # BLD AUTO: 7 K/UL (ref 1.8–7.7)
NEUTROPHILS NFR BLD: 87.7 % (ref 38–73)
NRBC BLD-RTO: 0 /100 WBC
PISA MRMAX VEL: 2.45 M/S
PISA TR MAX VEL: 2.15 M/S
PLATELET # BLD AUTO: 312 K/UL (ref 150–450)
PMV BLD AUTO: 10 FL (ref 9.2–12.9)
POCT GLUCOSE: 157 MG/DL (ref 70–110)
POTASSIUM SERPL-SCNC: 4.7 MMOL/L (ref 3.5–5.1)
PV PEAK S VEL: 0.4 M/S
PV PEAK VELOCITY: 1.28 CM/S
RA MAJOR: 4.69 CM
RA PRESSURE: 3 MMHG
RA WIDTH: 3.7 CM
RBC # BLD AUTO: 4.48 M/UL (ref 4–5.4)
SODIUM SERPL-SCNC: 139 MMOL/L (ref 136–145)
TDI LATERAL: 0.07 M/S
TDI SEPTAL: 0.06 M/S
TDI: 0.07 M/S
TR MAX PG: 18 MMHG
TV REST PULMONARY ARTERY PRESSURE: 21 MMHG
WBC # BLD AUTO: 8.01 K/UL (ref 3.9–12.7)

## 2023-06-12 PROCEDURE — 63600175 PHARM REV CODE 636 W HCPCS: Performed by: PHYSICIAN ASSISTANT

## 2023-06-12 PROCEDURE — 11000001 HC ACUTE MED/SURG PRIVATE ROOM

## 2023-06-12 PROCEDURE — 94640 AIRWAY INHALATION TREATMENT: CPT | Mod: XB

## 2023-06-12 PROCEDURE — 99233 SBSQ HOSP IP/OBS HIGH 50: CPT | Mod: ,,, | Performed by: INTERNAL MEDICINE

## 2023-06-12 PROCEDURE — 63600175 PHARM REV CODE 636 W HCPCS: Performed by: INTERNAL MEDICINE

## 2023-06-12 PROCEDURE — 27000646 HC AEROBIKA DEVICE

## 2023-06-12 PROCEDURE — 99233 PR SUBSEQUENT HOSPITAL CARE,LEVL III: ICD-10-PCS | Mod: ,,, | Performed by: INTERNAL MEDICINE

## 2023-06-12 PROCEDURE — 87205 SMEAR GRAM STAIN: CPT | Performed by: INTERNAL MEDICINE

## 2023-06-12 PROCEDURE — 27000221 HC OXYGEN, UP TO 24 HOURS

## 2023-06-12 PROCEDURE — 94664 DEMO&/EVAL PT USE INHALER: CPT

## 2023-06-12 PROCEDURE — 87070 CULTURE OTHR SPECIMN AEROBIC: CPT | Performed by: INTERNAL MEDICINE

## 2023-06-12 PROCEDURE — 80048 BASIC METABOLIC PNL TOTAL CA: CPT | Performed by: PHYSICIAN ASSISTANT

## 2023-06-12 PROCEDURE — 94761 N-INVAS EAR/PLS OXIMETRY MLT: CPT

## 2023-06-12 PROCEDURE — 85025 COMPLETE CBC W/AUTO DIFF WBC: CPT | Performed by: PHYSICIAN ASSISTANT

## 2023-06-12 PROCEDURE — 83735 ASSAY OF MAGNESIUM: CPT | Performed by: PHYSICIAN ASSISTANT

## 2023-06-12 PROCEDURE — 96372 THER/PROPH/DIAG INJ SC/IM: CPT | Performed by: PHYSICIAN ASSISTANT

## 2023-06-12 PROCEDURE — 99900035 HC TECH TIME PER 15 MIN (STAT)

## 2023-06-12 PROCEDURE — A4216 STERILE WATER/SALINE, 10 ML: HCPCS | Performed by: PHYSICIAN ASSISTANT

## 2023-06-12 PROCEDURE — 36415 COLL VENOUS BLD VENIPUNCTURE: CPT | Performed by: PHYSICIAN ASSISTANT

## 2023-06-12 PROCEDURE — 25000003 PHARM REV CODE 250: Performed by: PHYSICIAN ASSISTANT

## 2023-06-12 PROCEDURE — 94640 AIRWAY INHALATION TREATMENT: CPT

## 2023-06-12 PROCEDURE — 25000242 PHARM REV CODE 250 ALT 637 W/ HCPCS: Performed by: INTERNAL MEDICINE

## 2023-06-12 PROCEDURE — 25000003 PHARM REV CODE 250: Performed by: INTERNAL MEDICINE

## 2023-06-12 RX ORDER — CHLORTHALIDONE 25 MG/1
25 TABLET ORAL DAILY
Status: DISCONTINUED | OUTPATIENT
Start: 2023-06-12 | End: 2023-06-17 | Stop reason: HOSPADM

## 2023-06-12 RX ORDER — HYDRALAZINE HYDROCHLORIDE 20 MG/ML
10 INJECTION INTRAMUSCULAR; INTRAVENOUS EVERY 6 HOURS PRN
Status: DISCONTINUED | OUTPATIENT
Start: 2023-06-12 | End: 2023-06-17 | Stop reason: HOSPADM

## 2023-06-12 RX ORDER — METHYLPREDNISOLONE SOD SUCC 125 MG
125 VIAL (EA) INJECTION
Status: DISCONTINUED | OUTPATIENT
Start: 2023-06-12 | End: 2023-06-14

## 2023-06-12 RX ORDER — PROCHLORPERAZINE EDISYLATE 5 MG/ML
5 INJECTION INTRAMUSCULAR; INTRAVENOUS ONCE
Status: COMPLETED | OUTPATIENT
Start: 2023-06-12 | End: 2023-06-12

## 2023-06-12 RX ORDER — GUAIFENESIN/DEXTROMETHORPHAN 100-10MG/5
5 SYRUP ORAL EVERY 4 HOURS PRN
Status: DISCONTINUED | OUTPATIENT
Start: 2023-06-12 | End: 2023-06-13

## 2023-06-12 RX ORDER — HYDRALAZINE HYDROCHLORIDE 20 MG/ML
5 INJECTION INTRAMUSCULAR; INTRAVENOUS ONCE
Status: COMPLETED | OUTPATIENT
Start: 2023-06-12 | End: 2023-06-12

## 2023-06-12 RX ORDER — DIPHENHYDRAMINE HYDROCHLORIDE 50 MG/ML
12.5 INJECTION INTRAMUSCULAR; INTRAVENOUS ONCE
Status: COMPLETED | OUTPATIENT
Start: 2023-06-12 | End: 2023-06-12

## 2023-06-12 RX ADMIN — TIOTROPIUM BROMIDE INHALATION SPRAY 2 PUFF: 3.12 SPRAY, METERED RESPIRATORY (INHALATION) at 07:06

## 2023-06-12 RX ADMIN — SUCRALFATE 1 G: 1 TABLET ORAL at 09:06

## 2023-06-12 RX ADMIN — ATORVASTATIN CALCIUM 40 MG: 20 TABLET, FILM COATED ORAL at 10:06

## 2023-06-12 RX ADMIN — HEPARIN SODIUM 5000 UNITS: 5000 INJECTION INTRAVENOUS; SUBCUTANEOUS at 09:06

## 2023-06-12 RX ADMIN — METHYLPREDNISOLONE SODIUM SUCCINATE 125 MG: 125 INJECTION, POWDER, FOR SOLUTION INTRAMUSCULAR; INTRAVENOUS at 05:06

## 2023-06-12 RX ADMIN — SUCRALFATE 1 G: 1 TABLET ORAL at 10:06

## 2023-06-12 RX ADMIN — IPRATROPIUM BROMIDE AND ALBUTEROL SULFATE 3 ML: .5; 3 SOLUTION RESPIRATORY (INHALATION) at 07:06

## 2023-06-12 RX ADMIN — HEPARIN SODIUM 5000 UNITS: 5000 INJECTION INTRAVENOUS; SUBCUTANEOUS at 02:06

## 2023-06-12 RX ADMIN — GABAPENTIN 100 MG: 100 CAPSULE ORAL at 10:06

## 2023-06-12 RX ADMIN — IPRATROPIUM BROMIDE AND ALBUTEROL SULFATE 3 ML: .5; 3 SOLUTION RESPIRATORY (INHALATION) at 11:06

## 2023-06-12 RX ADMIN — HYDRALAZINE HYDROCHLORIDE 5 MG: 20 INJECTION INTRAMUSCULAR; INTRAVENOUS at 05:06

## 2023-06-12 RX ADMIN — SUCRALFATE 1 G: 1 TABLET ORAL at 05:06

## 2023-06-12 RX ADMIN — CLONAZEPAM 0.5 MG: 0.5 TABLET ORAL at 05:06

## 2023-06-12 RX ADMIN — IPRATROPIUM BROMIDE AND ALBUTEROL SULFATE 3 ML: .5; 3 SOLUTION RESPIRATORY (INHALATION) at 03:06

## 2023-06-12 RX ADMIN — CHLORTHALIDONE 25 MG: 25 TABLET ORAL at 10:06

## 2023-06-12 RX ADMIN — Medication 10 ML: at 05:06

## 2023-06-12 RX ADMIN — FLUTICASONE FUROATE AND VILANTEROL TRIFENATATE 1 PUFF: 200; 25 POWDER RESPIRATORY (INHALATION) at 07:06

## 2023-06-12 RX ADMIN — IPRATROPIUM BROMIDE AND ALBUTEROL SULFATE 3 ML: .5; 3 SOLUTION RESPIRATORY (INHALATION) at 04:06

## 2023-06-12 RX ADMIN — HEPARIN SODIUM 5000 UNITS: 5000 INJECTION INTRAVENOUS; SUBCUTANEOUS at 05:06

## 2023-06-12 RX ADMIN — AMLODIPINE BESYLATE 10 MG: 5 TABLET ORAL at 10:06

## 2023-06-12 RX ADMIN — LEVOFLOXACIN 750 MG: 750 INJECTION, SOLUTION INTRAVENOUS at 10:06

## 2023-06-12 RX ADMIN — CLONAZEPAM 0.5 MG: 0.5 TABLET ORAL at 09:06

## 2023-06-12 RX ADMIN — RISPERIDONE 1 MG: 1 TABLET ORAL at 09:06

## 2023-06-12 RX ADMIN — GABAPENTIN 100 MG: 100 CAPSULE ORAL at 09:06

## 2023-06-12 RX ADMIN — METHYLPREDNISOLONE SODIUM SUCCINATE 125 MG: 125 INJECTION, POWDER, FOR SOLUTION INTRAMUSCULAR; INTRAVENOUS at 12:06

## 2023-06-12 RX ADMIN — Medication 10 ML: at 09:06

## 2023-06-12 RX ADMIN — MIRTAZAPINE 15 MG: 15 TABLET, FILM COATED ORAL at 09:06

## 2023-06-12 RX ADMIN — TAMSULOSIN HYDROCHLORIDE 0.4 MG: 0.4 CAPSULE ORAL at 10:06

## 2023-06-12 RX ADMIN — GABAPENTIN 100 MG: 100 CAPSULE ORAL at 02:06

## 2023-06-12 RX ADMIN — DIPHENHYDRAMINE HYDROCHLORIDE 12.5 MG: 50 INJECTION, SOLUTION INTRAMUSCULAR; INTRAVENOUS at 10:06

## 2023-06-12 RX ADMIN — RISPERIDONE 1 MG: 1 TABLET ORAL at 10:06

## 2023-06-12 RX ADMIN — SUCRALFATE 1 G: 1 TABLET ORAL at 02:06

## 2023-06-12 RX ADMIN — Medication 10 ML: at 02:06

## 2023-06-12 RX ADMIN — LOSARTAN POTASSIUM 100 MG: 50 TABLET, FILM COATED ORAL at 10:06

## 2023-06-12 RX ADMIN — PROCHLORPERAZINE EDISYLATE 5 MG: 5 INJECTION INTRAMUSCULAR; INTRAVENOUS at 10:06

## 2023-06-12 NOTE — ASSESSMENT & PLAN NOTE
- chronic   - home meds: amlodipine 10 mg QD, losartan 100 mg QD    Has remained very hypertensive while here, likely not helped with significant beta agonist and steroid use.  Add hctz today and monitor, prn hydralazine as needed with caution for HR. Unable to use labetalol with significant asthma

## 2023-06-12 NOTE — PROCEDURES
EKG completed on patient and results given to nurse. Unable to transfer EKG to [facility] through [facility]. EKG was normal sinus rhythm with sinus arrhythmia. Physical copy in patients folder.

## 2023-06-12 NOTE — SUBJECTIVE & OBJECTIVE
Interval History: breathing a bit easier but still very tight in her chest and now having headaches. Continues with cough with green sputum. +BM, has ambulated some.     Review of Systems   Respiratory:  Positive for cough, chest tightness, shortness of breath and wheezing.    Cardiovascular:  Negative for chest pain, palpitations and leg swelling.   Gastrointestinal:  Negative for abdominal pain, constipation, diarrhea and nausea.   Skin:  Negative for rash.   Neurological:  Negative for dizziness and light-headedness.   Psychiatric/Behavioral:  Negative for confusion. The patient is not nervous/anxious.    Objective:     Vital Signs (Most Recent):  Temp: 97.6 °F (36.4 °C) (06/12/23 0448)  Pulse: (!) 112 (06/12/23 0800)  Resp: 18 (06/12/23 0751)  BP: (!) 152/99 (06/12/23 0534)  SpO2: 99 % (06/12/23 0751) Vital Signs (24h Range):  Temp:  [97.6 °F (36.4 °C)-97.9 °F (36.6 °C)] 97.6 °F (36.4 °C)  Pulse:  [] 112  Resp:  [16-20] 18  SpO2:  [92 %-99 %] 99 %  BP: (151-201)/() 152/99     Weight: 98.1 kg (216 lb 4.3 oz)  Body mass index is 34.91 kg/m².    Intake/Output Summary (Last 24 hours) at 6/12/2023 0911  Last data filed at 6/12/2023 0546  Gross per 24 hour   Intake 149.02 ml   Output 1000 ml   Net -850.98 ml         Physical Exam  Constitutional:       General: She is not in acute distress.     Appearance: She is well-developed. She is obese. She is ill-appearing.   Neck:      Trachea: No tracheal deviation.   Cardiovascular:      Rate and Rhythm: Normal rate and regular rhythm.      Heart sounds: Normal heart sounds. No murmur heard.    No gallop.   Pulmonary:      Effort: Respiratory distress (decreased resp distress and WOB) present.      Breath sounds: Decreased air movement present. Wheezing present. No rales.   Abdominal:      General: Bowel sounds are normal. There is no distension.      Palpations: Abdomen is soft.      Tenderness: There is no abdominal tenderness.   Musculoskeletal:          General: Normal range of motion.      Right lower leg: No edema.      Left lower leg: No edema.   Skin:     General: Skin is warm and dry.      Coloration: Skin is not pale.      Findings: No erythema or rash.   Neurological:      General: No focal deficit present.      Mental Status: She is alert and oriented to person, place, and time.      Motor: No abnormal muscle tone.      Comments: Chronically hoarse voice   Psychiatric:         Mood and Affect: Mood normal.         Behavior: Behavior normal.         Thought Content: Thought content normal.         Judgment: Judgment normal.           Significant Labs: All pertinent labs within the past 24 hours have been reviewed.    Significant Imaging: I have reviewed all pertinent imaging results/findings within the past 24 hours.

## 2023-06-12 NOTE — NURSING
0455  Patient c/o chest pain that radiates to left arm.  Pt reports numbness to left upper extremity. Pt also c/o blurry vision and headache. STAT EKG placed. Rapid response called. EKG normal.      0507 Pt given 5 mg of hydralazine and clonazepam. Gloria Angel    0534  /99

## 2023-06-12 NOTE — PLAN OF CARE
Pt resting comfortably.  No complaints of pain during shift.  Echo performed in morning.  PRN hydralazine ordered.  Chest physiotherapy ordered.  Excellent appetite.  Supplemental oxygen delivered via nc at 2L.  Vital signs closely monitored.  Telemetry monitor in place.  Safety measures in place.

## 2023-06-12 NOTE — PROGRESS NOTES
Pt received on 2LNC;SPO2 normal. Treatments were given as scheduled. Sputum sample was collected and sent to the lab. Aerobika was stared ;pt tolerated it well. No other changes were made at this time. Will continue to monitor.

## 2023-06-12 NOTE — ASSESSMENT & PLAN NOTE
- Ms. Talia Mueller presents with acute respiratory failure 2/2 COPD vs. Asthma exacerbation   - Patient with Hypoxic Respiratory failure which is Acute.  she is not on home oxygen.   - Signs/symptoms of respiratory failure include- tachypnea and increased work of breathing. Contributing diagnoses includes - COPD and Obesity Hypoventilation Labs and images were reviewed. Patient Has not had a recent ABG. Will treat underlying causes and adjust management of respiratory failure.   - COPD Exacerbation pathways initiated    - starting antibiotics given increased cough, increased sputum and change in sputum color    - will administer steroids IV [id number] due to mild respiratory distress and recent antibiotic use (1 month ago)       Continue scheduled bronchodilators and home inhalers  Continue steroids at decreased dose 125 q12H  Continue levaquin, still waiting on sputum culture to get collected  Echo pending  Maintains a chronic hoarse voice following her tracheostomy which was decannulated in 2022  On 2L with good sats but increased WOB

## 2023-06-12 NOTE — PROGRESS NOTES
[facility] Medicine  Progress Note    Patient Name: Talia Mueller  MRN: [id number]  Patient Class: OP- Observation   Admission Date: 6/10/2023  Length of Stay: 0 days  Attending Physician: Miguel Villanueva MD  Primary Care Provider: Camilla Lomas MD        Subjective:     Principal Problem:Acute hypoxemic respiratory failure        HPI:  Ms. Talia Mueller is a 58 y.o. female, with PMH of COPD, asthma, obesity, GEOVANNI, anemia, who presented to [facility] ED on 6/10/23 due to shortness of breath x 4 days. She tried using her albuterol without relief. She denied fever. EMS was called to bring her to the hospital. Upon arrival the was mildly hypotensive. She was treated with 1L IV fluids, and is now hypertensive, but her O2 sats have dropped. She was treated with DuoNebs, and steroids without significant improvement. She was placed on observation.       Overview/Hospital Course:  Patient presented with acute on chronic respiratory failure due to asthma/copd and was started on ATC bronchodilators and IV steroids. Echo obtained to assess cardiac function.       Interval History: breathing a bit easier but still very tight in her chest and now having headaches. Continues with cough with green sputum. +BM, has ambulated some.     Review of Systems   Respiratory:  Positive for cough, chest tightness, shortness of breath and wheezing.    Cardiovascular:  Negative for chest pain, palpitations and leg swelling.   Gastrointestinal:  Negative for abdominal pain, constipation, diarrhea and nausea.   Skin:  Negative for rash.   Neurological:  Negative for dizziness and light-headedness.   Psychiatric/Behavioral:  Negative for confusion. The patient is not nervous/anxious.    Objective:     Vital Signs (Most Recent):  Temp: 97.6 °F (36.4 °C) (06/12/23 0448)  Pulse: (!) 112 (06/12/23 0800)  Resp: 18 (06/12/23 0751)  BP: (!) 152/99 (06/12/23 0534)  SpO2: 99 % (06/12/23 0751) Vital Signs (24h Range):  Temp:   [97.6 °F (36.4 °C)-97.9 °F (36.6 °C)] 97.6 °F (36.4 °C)  Pulse:  [] 112  Resp:  [16-20] 18  SpO2:  [92 %-99 %] 99 %  BP: (151-201)/() 152/99     Weight: 98.1 kg (216 lb 4.3 oz)  Body mass index is 34.91 kg/m².    Intake/Output Summary (Last 24 hours) at 6/12/2023 0911  Last data filed at 6/12/2023 0546  Gross per 24 hour   Intake 149.02 ml   Output 1000 ml   Net -850.98 ml         Physical Exam  Constitutional:       General: She is not in acute distress.     Appearance: She is well-developed. She is obese. She is ill-appearing.   Neck:      Trachea: No tracheal deviation.   Cardiovascular:      Rate and Rhythm: Normal rate and regular rhythm.      Heart sounds: Normal heart sounds. No murmur heard.    No gallop.   Pulmonary:      Effort: Respiratory distress (decreased resp distress and WOB) present.      Breath sounds: Decreased air movement present. Wheezing present. No rales.   Abdominal:      General: Bowel sounds are normal. There is no distension.      Palpations: Abdomen is soft.      Tenderness: There is no abdominal tenderness.   Musculoskeletal:         General: Normal range of motion.      Right lower leg: No edema.      Left lower leg: No edema.   Skin:     General: Skin is warm and dry.      Coloration: Skin is not pale.      Findings: No erythema or rash.   Neurological:      General: No focal deficit present.      Mental Status: She is alert and oriented to person, place, and time.      Motor: No abnormal muscle tone.      Comments: Chronically hoarse voice   Psychiatric:         Mood and Affect: Mood normal.         Behavior: Behavior normal.         Thought Content: Thought content normal.         Judgment: Judgment normal.           Significant Labs: All pertinent labs within the past 24 hours have been reviewed.    Significant Imaging: I have reviewed all pertinent imaging results/findings within the past 24 hours.      Assessment/Plan:      * Acute hypoxemic respiratory failure  -  "Ms. Talia Mueller presents with acute respiratory failure 2/2 COPD vs. Asthma exacerbation   - Patient with Hypoxic Respiratory failure which is Acute.  she is not on home oxygen.   - Signs/symptoms of respiratory failure include- tachypnea and increased work of breathing. Contributing diagnoses includes - COPD and Obesity Hypoventilation Labs and images were reviewed. Patient Has not had a recent ABG. Will treat underlying causes and adjust management of respiratory failure.   - COPD Exacerbation pathways initiated    - starting antibiotics given increased cough, increased sputum and change in sputum color    - will administer steroids IV [id number] due to mild respiratory distress and recent antibiotic use (1 month ago)       Continue scheduled bronchodilators and home inhalers  Continue steroids at decreased dose 125 q12H  Continue levaquin, still waiting on sputum culture to get collected  Echo pending  Maintains a chronic hoarse voice following her tracheostomy which was decannulated in 2022  On 2L with good sats but increased WOB    Essential hypertension  - chronic   - home meds: amlodipine 10 mg QD, losartan 100 mg QD    Has remained very hypertensive while here, likely not helped with significant beta agonist and steroid use.  Add hctz today and monitor, prn hydralazine as needed with caution for HR. Unable to use labetalol with significant asthma    Anemia  - H&H stable  - monitor      GEOVANNI (obstructive sleep apnea)  - CPAP ordered       COPD exacerbation  As above in "acute hypoxemic respiratory failure"       Asthmatic bronchitis with acute exacerbation  As above in "acute hypoxemic respiratory failure"         VTE Risk Mitigation (From admission, onward)         Ordered     heparin (porcine) injection 5,000 Units  Every 8 hours         06/11/23 0627     IP VTE HIGH RISK PATIENT  Once         06/11/23 0627     Place sequential compression device  Until discontinued         06/11/23 0123          "       Discharge Planning   ITA:      Code Status: Full Code   Is the patient medically ready for discharge?:     Reason for patient still in hospital (select all that apply): Treatment  Discharge Plan A: Home Health                  Miguel Villanueva MD  Department of Hospital Medicine   [facility] ([place])

## 2023-06-12 NOTE — HOSPITAL COURSE
Patient presented with acute on chronic respiratory failure due to asthma/copd and was started on respiratory treatments with bronchodilators and IV steroids. Echo obtained to assess cardiac function showed grade I diastolic dysfunction.  Patient also had a severe cough that ultimately was treated with cough suppressants.     Pulmonary consulted given her failure to improve, severe cough, vocal cord dysfunction and recent tracheostomy/decannulation.  Recommended evaluation for tracheal stenosis with neck CT and ENT consultation.  Patient unable to lie flat for CT as she developed vertigo (chronic), and ENT not available until next week.  Agreed to attempt CT again the following day.      CT neck results showed narrowing of the airway beginning just below the level of the epiglottis and extending to the level of vocal cords, approximately 29 mm long.  Airway measured approximately 3-4 mm in AP dimension just below the epiglottis and 3 mm in transverse dimension at the level of the vocal cords.  There was a short segment of narrowing of the airway just below the cricoid measuring about 6 mm in AP dimension felt to be at the side of the tracheostomy cannula.  The airway measured 11 mm in transverse dimension and 12 mm in AP dimension at the thoracic inlet.    PCC followed patient while she was here, recommended ENT consultation given findings of tracheal stenosis above.  As patient has follow up scheduled with ENT in the coming week at [facility] as well as with her PCP on Monday and ENT was not available here until next week she is OK to be discharged to follow up at [facility] as scheduled.  Patient's wheezing resolved prior to discharge and she will complete the course of oral steroids as outpatient.  Her Duonebs have been changed to Xopenex in combination with Spiriva as she tends to get tachycardic and with worsening cough with the Duonebs.  Despite the 99 pack year history of smoking her main problem seems to be the tracheal  stenosis.

## 2023-06-13 LAB
ANION GAP SERPL CALC-SCNC: 11 MMOL/L (ref 8–16)
BASOPHILS # BLD AUTO: 0.01 K/UL (ref 0–0.2)
BASOPHILS NFR BLD: 0.1 % (ref 0–1.9)
BUN SERPL-MCNC: 27 MG/DL (ref 6–20)
CALCIUM SERPL-MCNC: 10.4 MG/DL (ref 8.7–10.5)
CHLORIDE SERPL-SCNC: 103 MMOL/L (ref 95–110)
CO2 SERPL-SCNC: 25 MMOL/L (ref 23–29)
CREAT SERPL-MCNC: 0.8 MG/DL (ref 0.5–1.4)
DIFFERENTIAL METHOD: ABNORMAL
EOSINOPHIL # BLD AUTO: 0 K/UL (ref 0–0.5)
EOSINOPHIL NFR BLD: 0 % (ref 0–8)
ERYTHROCYTE [DISTWIDTH] IN BLOOD BY AUTOMATED COUNT: 17.3 % (ref 11.5–14.5)
EST. GFR  (NO RACE VARIABLE): >60 ML/MIN/1.73 M^2
GLUCOSE SERPL-MCNC: 152 MG/DL (ref 70–110)
HCT VFR BLD AUTO: 36.5 % (ref 37–48.5)
HGB BLD-MCNC: 11.4 G/DL (ref 12–16)
IMM GRANULOCYTES # BLD AUTO: 0.24 K/UL (ref 0–0.04)
IMM GRANULOCYTES NFR BLD AUTO: 2.2 % (ref 0–0.5)
LYMPHOCYTES # BLD AUTO: 0.6 K/UL (ref 1–4.8)
LYMPHOCYTES NFR BLD: 5.5 % (ref 18–48)
MAGNESIUM SERPL-MCNC: 2.1 MG/DL (ref 1.6–2.6)
MCH RBC QN AUTO: 25.5 PG (ref 27–31)
MCHC RBC AUTO-ENTMCNC: 31.2 G/DL (ref 32–36)
MCV RBC AUTO: 82 FL (ref 82–98)
MONOCYTES # BLD AUTO: 0.4 K/UL (ref 0.3–1)
MONOCYTES NFR BLD: 3.1 % (ref 4–15)
NEUTROPHILS # BLD AUTO: 9.9 K/UL (ref 1.8–7.7)
NEUTROPHILS NFR BLD: 89.1 % (ref 38–73)
NRBC BLD-RTO: 0 /100 WBC
PLATELET # BLD AUTO: 320 K/UL (ref 150–450)
PMV BLD AUTO: 10.3 FL (ref 9.2–12.9)
POTASSIUM SERPL-SCNC: 4 MMOL/L (ref 3.5–5.1)
RBC # BLD AUTO: 4.47 M/UL (ref 4–5.4)
SODIUM SERPL-SCNC: 139 MMOL/L (ref 136–145)
WBC # BLD AUTO: 11.12 K/UL (ref 3.9–12.7)

## 2023-06-13 PROCEDURE — 27000646 HC AEROBIKA DEVICE

## 2023-06-13 PROCEDURE — 94761 N-INVAS EAR/PLS OXIMETRY MLT: CPT

## 2023-06-13 PROCEDURE — 80048 BASIC METABOLIC PNL TOTAL CA: CPT | Performed by: PHYSICIAN ASSISTANT

## 2023-06-13 PROCEDURE — 85025 COMPLETE CBC W/AUTO DIFF WBC: CPT | Performed by: PHYSICIAN ASSISTANT

## 2023-06-13 PROCEDURE — 99233 SBSQ HOSP IP/OBS HIGH 50: CPT | Mod: ,,, | Performed by: HOSPITALIST

## 2023-06-13 PROCEDURE — A4216 STERILE WATER/SALINE, 10 ML: HCPCS | Performed by: PHYSICIAN ASSISTANT

## 2023-06-13 PROCEDURE — 94640 AIRWAY INHALATION TREATMENT: CPT

## 2023-06-13 PROCEDURE — 25000003 PHARM REV CODE 250: Performed by: PHYSICIAN ASSISTANT

## 2023-06-13 PROCEDURE — 83735 ASSAY OF MAGNESIUM: CPT | Performed by: PHYSICIAN ASSISTANT

## 2023-06-13 PROCEDURE — 21400001 HC TELEMETRY ROOM

## 2023-06-13 PROCEDURE — 25000242 PHARM REV CODE 250 ALT 637 W/ HCPCS: Performed by: INTERNAL MEDICINE

## 2023-06-13 PROCEDURE — 25000003 PHARM REV CODE 250: Performed by: INTERNAL MEDICINE

## 2023-06-13 PROCEDURE — 94664 DEMO&/EVAL PT USE INHALER: CPT

## 2023-06-13 PROCEDURE — 63600175 PHARM REV CODE 636 W HCPCS: Performed by: INTERNAL MEDICINE

## 2023-06-13 PROCEDURE — 63600175 PHARM REV CODE 636 W HCPCS: Performed by: PHYSICIAN ASSISTANT

## 2023-06-13 PROCEDURE — 99233 PR SUBSEQUENT HOSPITAL CARE,LEVL III: ICD-10-PCS | Mod: ,,, | Performed by: HOSPITALIST

## 2023-06-13 PROCEDURE — 25000003 PHARM REV CODE 250: Performed by: HOSPITALIST

## 2023-06-13 PROCEDURE — 99900035 HC TECH TIME PER 15 MIN (STAT)

## 2023-06-13 PROCEDURE — 27000221 HC OXYGEN, UP TO 24 HOURS

## 2023-06-13 PROCEDURE — 36415 COLL VENOUS BLD VENIPUNCTURE: CPT | Performed by: PHYSICIAN ASSISTANT

## 2023-06-13 RX ORDER — PROMETHAZINE HYDROCHLORIDE AND CODEINE PHOSPHATE 6.25; 1 MG/5ML; MG/5ML
10 SOLUTION ORAL EVERY 4 HOURS PRN
Status: DISCONTINUED | OUTPATIENT
Start: 2023-06-13 | End: 2023-06-17 | Stop reason: HOSPADM

## 2023-06-13 RX ORDER — ALBUTEROL SULFATE 2.5 MG/.5ML
2.5 SOLUTION RESPIRATORY (INHALATION) EVERY 4 HOURS PRN
Status: DISCONTINUED | OUTPATIENT
Start: 2023-06-13 | End: 2023-06-17 | Stop reason: HOSPADM

## 2023-06-13 RX ADMIN — SUCRALFATE 1 G: 1 TABLET ORAL at 08:06

## 2023-06-13 RX ADMIN — GUAIFENESIN AND DEXTROMETHORPHAN 5 ML: 100; 10 SYRUP ORAL at 09:06

## 2023-06-13 RX ADMIN — METHYLPREDNISOLONE SODIUM SUCCINATE 125 MG: 125 INJECTION, POWDER, FOR SOLUTION INTRAMUSCULAR; INTRAVENOUS at 05:06

## 2023-06-13 RX ADMIN — PROMETHAZINE HYDROCHLORIDE AND CODEINE PHOSPHATE 10 ML: 6.25; 1 SOLUTION ORAL at 10:06

## 2023-06-13 RX ADMIN — MIRTAZAPINE 15 MG: 15 TABLET, FILM COATED ORAL at 08:06

## 2023-06-13 RX ADMIN — Medication 10 ML: at 09:06

## 2023-06-13 RX ADMIN — HEPARIN SODIUM 5000 UNITS: 5000 INJECTION INTRAVENOUS; SUBCUTANEOUS at 09:06

## 2023-06-13 RX ADMIN — TAMSULOSIN HYDROCHLORIDE 0.4 MG: 0.4 CAPSULE ORAL at 09:06

## 2023-06-13 RX ADMIN — PROMETHAZINE HYDROCHLORIDE AND CODEINE PHOSPHATE 10 ML: 6.25; 1 SOLUTION ORAL at 05:06

## 2023-06-13 RX ADMIN — GABAPENTIN 100 MG: 100 CAPSULE ORAL at 03:06

## 2023-06-13 RX ADMIN — IPRATROPIUM BROMIDE AND ALBUTEROL SULFATE 3 ML: .5; 3 SOLUTION RESPIRATORY (INHALATION) at 07:06

## 2023-06-13 RX ADMIN — IPRATROPIUM BROMIDE AND ALBUTEROL SULFATE 3 ML: .5; 3 SOLUTION RESPIRATORY (INHALATION) at 08:06

## 2023-06-13 RX ADMIN — LEVOFLOXACIN 750 MG: 750 INJECTION, SOLUTION INTRAVENOUS at 09:06

## 2023-06-13 RX ADMIN — GABAPENTIN 100 MG: 100 CAPSULE ORAL at 09:06

## 2023-06-13 RX ADMIN — Medication 10 ML: at 05:06

## 2023-06-13 RX ADMIN — IPRATROPIUM BROMIDE AND ALBUTEROL SULFATE 3 ML: .5; 3 SOLUTION RESPIRATORY (INHALATION) at 11:06

## 2023-06-13 RX ADMIN — SUCRALFATE 1 G: 1 TABLET ORAL at 05:06

## 2023-06-13 RX ADMIN — HEPARIN SODIUM 5000 UNITS: 5000 INJECTION INTRAVENOUS; SUBCUTANEOUS at 05:06

## 2023-06-13 RX ADMIN — ATORVASTATIN CALCIUM 40 MG: 20 TABLET, FILM COATED ORAL at 09:06

## 2023-06-13 RX ADMIN — Medication 10 ML: at 02:06

## 2023-06-13 RX ADMIN — RISPERIDONE 1 MG: 1 TABLET ORAL at 08:06

## 2023-06-13 RX ADMIN — RISPERIDONE 1 MG: 1 TABLET ORAL at 09:06

## 2023-06-13 RX ADMIN — AMLODIPINE BESYLATE 10 MG: 5 TABLET ORAL at 09:06

## 2023-06-13 RX ADMIN — HEPARIN SODIUM 5000 UNITS: 5000 INJECTION INTRAVENOUS; SUBCUTANEOUS at 01:06

## 2023-06-13 RX ADMIN — CHLORTHALIDONE 25 MG: 25 TABLET ORAL at 09:06

## 2023-06-13 RX ADMIN — FLUTICASONE FUROATE AND VILANTEROL TRIFENATATE 1 PUFF: 200; 25 POWDER RESPIRATORY (INHALATION) at 08:06

## 2023-06-13 RX ADMIN — ACETAMINOPHEN 650 MG: 325 TABLET, FILM COATED ORAL at 09:06

## 2023-06-13 RX ADMIN — SUCRALFATE 1 G: 1 TABLET ORAL at 01:06

## 2023-06-13 RX ADMIN — PROMETHAZINE HYDROCHLORIDE AND CODEINE PHOSPHATE 10 ML: 6.25; 1 SOLUTION ORAL at 01:06

## 2023-06-13 RX ADMIN — SUCRALFATE 1 G: 1 TABLET ORAL at 09:06

## 2023-06-13 RX ADMIN — GABAPENTIN 100 MG: 100 CAPSULE ORAL at 08:06

## 2023-06-13 RX ADMIN — LOSARTAN POTASSIUM 100 MG: 50 TABLET, FILM COATED ORAL at 09:06

## 2023-06-13 RX ADMIN — IPRATROPIUM BROMIDE AND ALBUTEROL SULFATE 3 ML: .5; 3 SOLUTION RESPIRATORY (INHALATION) at 04:06

## 2023-06-13 RX ADMIN — TIOTROPIUM BROMIDE INHALATION SPRAY 2 PUFF: 3.12 SPRAY, METERED RESPIRATORY (INHALATION) at 08:06

## 2023-06-13 NOTE — ASSESSMENT & PLAN NOTE
- Ms. Talia Mueller presents with acute respiratory failure 2/2 COPD vs. Asthma exacerbation   - Patient with Hypoxic Respiratory failure which is Acute.  she is not on home oxygen but requiring oxygen here   - Signs/symptoms of respiratory failure include- tachypnea and increased work of breathing. Contributing diagnoses includes - COPD and Obesity Hypoventilation Labs and images were reviewed. Will treat underlying causes and adjust management of respiratory failure.   - COPD Exacerbation pathways initiated    - starting antibiotics given increased cough, increased sputum and change in sputum color    - will administer steroids IV [id number] due to respiratory distress and recent antibiotic use (1 month ago)       Continue scheduled bronchodilators and home inhalers  Continue steroids at decreased dose 125 q12H  Continue levaquin, still waiting on sputum culture to get collected  Echo pending  Maintains a chronic hoarse voice following her tracheostomy which was decannulated in 2022  On 2L with good sats but increased WOB

## 2023-06-13 NOTE — ASSESSMENT & PLAN NOTE
Body mass index is 34.86 kg/m². Morbid obesity complicates all aspects of disease management from diagnostic modalities to treatment. Weight loss encouraged and health benefits explained to patient.

## 2023-06-13 NOTE — SUBJECTIVE & OBJECTIVE
Interval History: Has severe cough, barely able to talk.  Gets tachycardic when coughing, then bradycardic.  Maintaining oxygen saturation but wheezing significantly.    Review of Systems   Constitutional:  Negative for chills and fever.   Respiratory:  Positive for cough, shortness of breath and wheezing.    Cardiovascular:  Negative for chest pain and palpitations.   Objective:     Vital Signs (Most Recent):  Temp: 98.1 °F (36.7 °C) (06/13/23 1610)  Pulse: 73 (06/13/23 1610)  Resp: 16 (06/13/23 1610)  BP: (!) 171/85 (06/13/23 1610)  SpO2: 99 % (06/13/23 1610) Vital Signs (24h Range):  Temp:  [97.5 °F (36.4 °C)-98.1 °F (36.7 °C)] 98.1 °F (36.7 °C)  Pulse:  [] 73  Resp:  [16-20] 16  SpO2:  [95 %-100 %] 99 %  BP: (145-171)/(72-94) 171/85     Weight: 98 kg (216 lb)  Body mass index is 34.86 kg/m².    Intake/Output Summary (Last 24 hours) at 6/13/2023 1612  Last data filed at 6/13/2023 1354  Gross per 24 hour   Intake 1600 ml   Output 1650 ml   Net -50 ml         Physical Exam  Constitutional:       General: She is in acute distress.   Cardiovascular:      Rate and Rhythm: Normal rate and regular rhythm.      Heart sounds: Normal heart sounds. No murmur heard.    No gallop.   Pulmonary:      Breath sounds: Wheezing and rhonchi present.      Comments: Coughing.  Expiratory wheezing noted  Abdominal:      General: Bowel sounds are normal.      Palpations: Abdomen is soft.   Skin:     General: Skin is warm and dry.   Neurological:      General: No focal deficit present.      Mental Status: She is alert.   Psychiatric:         Mood and Affect: Mood normal.         Behavior: Behavior normal.           Significant Labs: All pertinent labs within the past 24 hours have been reviewed.    Significant Imaging: I have reviewed all pertinent imaging results/findings within the past 24 hours.

## 2023-06-13 NOTE — PLAN OF CARE
POC reviewed w/ pt. AAOx4. VSS on 2L NC. C/o cough and pain treated w/ PRN medication per MAR. Pt voids and shifts in bed independently. No injuries, falls, or trauma occurred during shift. Purposeful rounding completed. Bed low and locked, side rails up x3, call light within reach.

## 2023-06-13 NOTE — ASSESSMENT & PLAN NOTE
- chronic   - home meds: amlodipine 10 mg QD, losartan 100 mg QD    Has remained very hypertensive while here, likely not helped with significant beta agonist and steroid use.  Added HCTZ, continue prn hydralazine as needed with caution for HR. Unable to use labetalol with significant asthma

## 2023-06-13 NOTE — PROGRESS NOTES
"[facility], [place], operated by [facility] Medicine  Progress Note    Patient Name: Talia Mueller  MRN: [id number]  Patient Class: IP- Inpatient   Admission Date: 6/10/2023  Length of Stay: 1 days  Attending Physician: Estelita Corona MD  Primary Care Provider: Camilla Lomas MD        Subjective:     Principal Problem:Acute hypoxemic respiratory failure        HPI:  From H&P by Dalila White PA:  "Ms. Talia Mueller is a 58 y.o. female, with PMH of COPD, asthma, obesity, GEOVANNI, anemia, who presented to [facility] ED on 6/10/23 due to shortness of breath x 4 days. She tried using her albuterol without relief. She denied fever. EMS was called to bring her to the hospital. Upon arrival the was mildly hypotensive. She was treated with 1L IV fluids, and is now hypertensive, but her O2 sats have dropped. She was treated with DuoNebs, and steroids without significant improvement."      Overview/Hospital Course:  Patient presented with acute on chronic respiratory failure due to asthma/copd and was started on respiratory treatments with bronchodilators and IV steroids. Echo obtained to assess cardiac function showed grade I diastolic dysfunction.  Patient also had a severe cough that ultimately was treated with cough suppressants.       Interval History: Has severe cough, barely able to talk.  Gets tachycardic when coughing, then bradycardic.  Maintaining oxygen saturation but wheezing significantly.    Review of Systems   Constitutional:  Negative for chills and fever.   Respiratory:  Positive for cough, shortness of breath and wheezing.    Cardiovascular:  Negative for chest pain and palpitations.   Objective:     Vital Signs (Most Recent):  Temp: 98.1 °F (36.7 °C) (06/13/23 1610)  Pulse: 73 (06/13/23 1610)  Resp: 16 (06/13/23 1610)  BP: (!) 171/85 (06/13/23 1610)  SpO2: 99 % (06/13/23 1610) Vital Signs (24h Range):  Temp:  [97.5 °F (36.4 °C)-98.1 °F (36.7 °C)] 98.1 °F (36.7 °C)  Pulse:  [] 73  Resp:  [16-20] " 16  SpO2:  [95 %-100 %] 99 %  BP: (145-171)/(72-94) 171/85     Weight: 98 kg (216 lb)  Body mass index is 34.86 kg/m².    Intake/Output Summary (Last 24 hours) at 6/13/2023 1612  Last data filed at 6/13/2023 1354  Gross per 24 hour   Intake 1600 ml   Output 1650 ml   Net -50 ml         Physical Exam  Constitutional:       General: She is in acute distress.   Cardiovascular:      Rate and Rhythm: Normal rate and regular rhythm.      Heart sounds: Normal heart sounds. No murmur heard.    No gallop.   Pulmonary:      Breath sounds: Wheezing and rhonchi present.      Comments: Coughing.  Expiratory wheezing noted  Abdominal:      General: Bowel sounds are normal.      Palpations: Abdomen is soft.   Skin:     General: Skin is warm and dry.   Neurological:      General: No focal deficit present.      Mental Status: She is alert.   Psychiatric:         Mood and Affect: Mood normal.         Behavior: Behavior normal.           Significant Labs: All pertinent labs within the past 24 hours have been reviewed.    Significant Imaging: I have reviewed all pertinent imaging results/findings within the past 24 hours.      Assessment/Plan:      * Acute hypoxemic respiratory failure  - Ms. Talia Mueller presents with acute respiratory failure 2/2 COPD vs. Asthma exacerbation   - Patient with Hypoxic Respiratory failure which is Acute.  she is not on home oxygen but requiring oxygen here   - Signs/symptoms of respiratory failure include- tachypnea and increased work of breathing. Contributing diagnoses includes - COPD and Obesity Hypoventilation Labs and images were reviewed. Will treat underlying causes and adjust management of respiratory failure.   - COPD Exacerbation pathways initiated    - starting antibiotics given increased cough, increased sputum and change in sputum color    - will administer steroids IV [id number] due to respiratory distress and recent antibiotic use (1 month ago)       Continue scheduled  "bronchodilators and home inhalers  Continue steroids at decreased dose 125 q12H  Continue levaquin, still waiting on sputum culture to get collected  Echo pending  Maintains a chronic hoarse voice following her tracheostomy which was decannulated in 2022  On 2L with good sats but increased WOB    Asthmatic bronchitis with acute exacerbation  As above in "acute hypoxemic respiratory failure"       Essential hypertension  - chronic   - home meds: amlodipine 10 mg QD, losartan 100 mg QD    Has remained very hypertensive while here, likely not helped with significant beta agonist and steroid use.  Added HCTZ, continue prn hydralazine as needed with caution for HR. Unable to use labetalol with significant asthma    Anemia  - H&H stable  - monitor      Morbid obesity  Body mass index is 34.86 kg/m². Morbid obesity complicates all aspects of disease management from diagnostic modalities to treatment. Weight loss encouraged and health benefits explained to patient.         GEOVANNI (obstructive sleep apnea)  - CPAP ordered but patient refuses to use it.      COPD exacerbation  As above in "acute hypoxemic respiratory failure"   99 pack year history of smoking, quit 2016      VTE Risk Mitigation (From admission, onward)         Ordered     heparin (porcine) injection 5,000 Units  Every 8 hours         06/11/23 0627     IP VTE HIGH RISK PATIENT  Once         06/11/23 0627     Place sequential compression device  Until discontinued         06/11/23 0123                Discharge Planning   ITA:      Code Status: Full Code   Is the patient medically ready for discharge?:     Reason for patient still in hospital (select all that apply): Patient unstable, Patient trending condition and Treatment  Discharge Plan A: Home Health                  Estelita Estevez MD  Department of Hospital Medicine   [facility] ([place])  "

## 2023-06-14 LAB
BACTERIA SPEC AEROBE CULT: NORMAL
BACTERIA SPEC AEROBE CULT: NORMAL
GRAM STN SPEC: NORMAL

## 2023-06-14 PROCEDURE — 63600175 PHARM REV CODE 636 W HCPCS: Performed by: INTERNAL MEDICINE

## 2023-06-14 PROCEDURE — 25000242 PHARM REV CODE 250 ALT 637 W/ HCPCS: Performed by: INTERNAL MEDICINE

## 2023-06-14 PROCEDURE — 99900035 HC TECH TIME PER 15 MIN (STAT)

## 2023-06-14 PROCEDURE — 94664 DEMO&/EVAL PT USE INHALER: CPT

## 2023-06-14 PROCEDURE — 63600175 PHARM REV CODE 636 W HCPCS: Performed by: PHYSICIAN ASSISTANT

## 2023-06-14 PROCEDURE — 25000003 PHARM REV CODE 250: Performed by: INTERNAL MEDICINE

## 2023-06-14 PROCEDURE — 94761 N-INVAS EAR/PLS OXIMETRY MLT: CPT

## 2023-06-14 PROCEDURE — A4216 STERILE WATER/SALINE, 10 ML: HCPCS | Performed by: PHYSICIAN ASSISTANT

## 2023-06-14 PROCEDURE — 63600175 PHARM REV CODE 636 W HCPCS: Performed by: HOSPITALIST

## 2023-06-14 PROCEDURE — 99233 PR SUBSEQUENT HOSPITAL CARE,LEVL III: ICD-10-PCS | Mod: ,,, | Performed by: HOSPITALIST

## 2023-06-14 PROCEDURE — 25000003 PHARM REV CODE 250: Performed by: HOSPITALIST

## 2023-06-14 PROCEDURE — 99233 SBSQ HOSP IP/OBS HIGH 50: CPT | Mod: ,,, | Performed by: HOSPITALIST

## 2023-06-14 PROCEDURE — 94640 AIRWAY INHALATION TREATMENT: CPT

## 2023-06-14 PROCEDURE — 25000003 PHARM REV CODE 250: Performed by: PHYSICIAN ASSISTANT

## 2023-06-14 PROCEDURE — 21400001 HC TELEMETRY ROOM

## 2023-06-14 PROCEDURE — 27000221 HC OXYGEN, UP TO 24 HOURS

## 2023-06-14 RX ORDER — PREDNISONE 20 MG/1
40 TABLET ORAL DAILY
Status: DISCONTINUED | OUTPATIENT
Start: 2023-06-14 | End: 2023-06-17 | Stop reason: HOSPADM

## 2023-06-14 RX ORDER — GUAIFENESIN 600 MG/1
600 TABLET, EXTENDED RELEASE ORAL 2 TIMES DAILY
Status: DISCONTINUED | OUTPATIENT
Start: 2023-06-14 | End: 2023-06-17 | Stop reason: HOSPADM

## 2023-06-14 RX ADMIN — TRAMADOL HYDROCHLORIDE 50 MG: 50 TABLET, COATED ORAL at 10:06

## 2023-06-14 RX ADMIN — HEPARIN SODIUM 5000 UNITS: 5000 INJECTION INTRAVENOUS; SUBCUTANEOUS at 01:06

## 2023-06-14 RX ADMIN — FLUTICASONE FUROATE AND VILANTEROL TRIFENATATE 1 PUFF: 200; 25 POWDER RESPIRATORY (INHALATION) at 07:06

## 2023-06-14 RX ADMIN — HEPARIN SODIUM 5000 UNITS: 5000 INJECTION INTRAVENOUS; SUBCUTANEOUS at 10:06

## 2023-06-14 RX ADMIN — Medication 10 ML: at 02:06

## 2023-06-14 RX ADMIN — GUAIFENESIN 600 MG: 600 TABLET, EXTENDED RELEASE ORAL at 10:06

## 2023-06-14 RX ADMIN — HEPARIN SODIUM 5000 UNITS: 5000 INJECTION INTRAVENOUS; SUBCUTANEOUS at 05:06

## 2023-06-14 RX ADMIN — ATORVASTATIN CALCIUM 40 MG: 20 TABLET, FILM COATED ORAL at 08:06

## 2023-06-14 RX ADMIN — TAMSULOSIN HYDROCHLORIDE 0.4 MG: 0.4 CAPSULE ORAL at 08:06

## 2023-06-14 RX ADMIN — IPRATROPIUM BROMIDE AND ALBUTEROL SULFATE 3 ML: .5; 3 SOLUTION RESPIRATORY (INHALATION) at 07:06

## 2023-06-14 RX ADMIN — SUCRALFATE 1 G: 1 TABLET ORAL at 10:06

## 2023-06-14 RX ADMIN — PROMETHAZINE HYDROCHLORIDE AND CODEINE PHOSPHATE 10 ML: 6.25; 1 SOLUTION ORAL at 12:06

## 2023-06-14 RX ADMIN — LOSARTAN POTASSIUM 100 MG: 50 TABLET, FILM COATED ORAL at 08:06

## 2023-06-14 RX ADMIN — Medication 10 ML: at 10:06

## 2023-06-14 RX ADMIN — PROMETHAZINE HYDROCHLORIDE AND CODEINE PHOSPHATE 10 ML: 6.25; 1 SOLUTION ORAL at 05:06

## 2023-06-14 RX ADMIN — RISPERIDONE 1 MG: 1 TABLET ORAL at 08:06

## 2023-06-14 RX ADMIN — GABAPENTIN 100 MG: 100 CAPSULE ORAL at 08:06

## 2023-06-14 RX ADMIN — PREDNISONE 40 MG: 20 TABLET ORAL at 08:06

## 2023-06-14 RX ADMIN — Medication 10 ML: at 05:06

## 2023-06-14 RX ADMIN — GUAIFENESIN 600 MG: 600 TABLET, EXTENDED RELEASE ORAL at 11:06

## 2023-06-14 RX ADMIN — SUCRALFATE 1 G: 1 TABLET ORAL at 05:06

## 2023-06-14 RX ADMIN — PROMETHAZINE HYDROCHLORIDE AND CODEINE PHOSPHATE 10 ML: 6.25; 1 SOLUTION ORAL at 08:06

## 2023-06-14 RX ADMIN — AMLODIPINE BESYLATE 10 MG: 5 TABLET ORAL at 08:06

## 2023-06-14 RX ADMIN — TIOTROPIUM BROMIDE INHALATION SPRAY 2 PUFF: 3.12 SPRAY, METERED RESPIRATORY (INHALATION) at 07:06

## 2023-06-14 RX ADMIN — LEVOFLOXACIN 750 MG: 750 INJECTION, SOLUTION INTRAVENOUS at 08:06

## 2023-06-14 RX ADMIN — MIRTAZAPINE 15 MG: 15 TABLET, FILM COATED ORAL at 10:06

## 2023-06-14 RX ADMIN — METHYLPREDNISOLONE SODIUM SUCCINATE 125 MG: 125 INJECTION, POWDER, FOR SOLUTION INTRAMUSCULAR; INTRAVENOUS at 05:06

## 2023-06-14 RX ADMIN — IPRATROPIUM BROMIDE AND ALBUTEROL SULFATE 3 ML: .5; 3 SOLUTION RESPIRATORY (INHALATION) at 04:06

## 2023-06-14 RX ADMIN — CHLORTHALIDONE 25 MG: 25 TABLET ORAL at 08:06

## 2023-06-14 RX ADMIN — RISPERIDONE 1 MG: 1 TABLET ORAL at 10:06

## 2023-06-14 RX ADMIN — Medication 6 MG: at 10:06

## 2023-06-14 RX ADMIN — GABAPENTIN 100 MG: 100 CAPSULE ORAL at 03:06

## 2023-06-14 RX ADMIN — GABAPENTIN 100 MG: 100 CAPSULE ORAL at 10:06

## 2023-06-14 RX ADMIN — SUCRALFATE 1 G: 1 TABLET ORAL at 12:06

## 2023-06-14 RX ADMIN — SUCRALFATE 1 G: 1 TABLET ORAL at 08:06

## 2023-06-14 RX ADMIN — IPRATROPIUM BROMIDE AND ALBUTEROL SULFATE 3 ML: .5; 3 SOLUTION RESPIRATORY (INHALATION) at 01:06

## 2023-06-14 RX ADMIN — PROMETHAZINE HYDROCHLORIDE AND CODEINE PHOSPHATE 10 ML: 6.25; 1 SOLUTION ORAL at 10:06

## 2023-06-14 NOTE — PLAN OF CARE
POC reviewed. Purposeful rounding completed. No significant events this shift. VS and assessment per flowsheets. Received PRN cough medication according to MAR. No complaints of pain. Pt remains on O2 2L per NC. No injuries, falls, or trauma occurred during shift. Bed low and locked with side rails up x3, call light within reach. Safety maintained.       Problem: Adult Inpatient Plan of Care  Goal: Plan of Care Review  Outcome: Ongoing, Progressing  Goal: Patient-Specific Goal (Individualized)  Outcome: Ongoing, Progressing  Goal: Absence of Hospital-Acquired Illness or Injury  Outcome: Ongoing, Progressing  Goal: Optimal Comfort and Wellbeing  Outcome: Ongoing, Progressing  Goal: Readiness for Transition of Care  Outcome: Ongoing, Progressing     Problem: Oral Intake Inadequate COPD (Chronic Obstructive Pulmonary Disease)  Goal: Improved Nutrition Intake  Outcome: Ongoing, Progressing

## 2023-06-14 NOTE — ASSESSMENT & PLAN NOTE
Body mass index is 34.94 kg/m². Morbid obesity complicates all aspects of disease management from diagnostic modalities to treatment. Weight loss encouraged and health benefits explained to patient.        Admitted

## 2023-06-14 NOTE — SUBJECTIVE & OBJECTIVE
Interval History: She starts coughing and can't stop.  Has had mild hemoptysis with this.  Wheezing significantly.    Review of Systems   Constitutional:  Negative for chills and fever.   Respiratory:  Positive for cough, shortness of breath and wheezing.    Cardiovascular:  Negative for chest pain and palpitations.   Objective:     Vital Signs (Most Recent):  Temp: 97.9 °F (36.6 °C) (06/14/23 1603)  Pulse: 97 (06/14/23 1647)  Resp: 18 (06/14/23 1647)  BP: (!) 140/86 (06/14/23 1603)  SpO2: 98 % (06/14/23 1647) Vital Signs (24h Range):  Temp:  [97.4 °F (36.3 °C)-98.7 °F (37.1 °C)] 97.9 °F (36.6 °C)  Pulse:  [] 97  Resp:  [18-20] 18  SpO2:  [94 %-99 %] 98 %  BP: (140-174)/(80-99) 140/86     Weight: 98.2 kg (216 lb 7.9 oz)  Body mass index is 34.94 kg/m².    Intake/Output Summary (Last 24 hours) at 6/14/2023 1721  Last data filed at 6/14/2023 1325  Gross per 24 hour   Intake 1811.55 ml   Output --   Net 1811.55 ml         Physical Exam  Constitutional:       General: She is in acute distress.   HENT:      Nose: Congestion present.   Cardiovascular:      Rate and Rhythm: Normal rate and regular rhythm.      Heart sounds: Normal heart sounds. No murmur heard.    No gallop.   Pulmonary:      Breath sounds: Wheezing and rhonchi present.      Comments: Coughing.  Expiratory wheezing noted  Abdominal:      General: Bowel sounds are normal.      Palpations: Abdomen is soft.   Skin:     General: Skin is warm and dry.   Neurological:      General: No focal deficit present.      Mental Status: She is alert.   Psychiatric:         Mood and Affect: Mood normal.         Behavior: Behavior normal.           Significant Labs: All pertinent labs within the past 24 hours have been reviewed.    Significant Imaging: I have reviewed all pertinent imaging results/findings within the past 24 hours.

## 2023-06-14 NOTE — PLAN OF CARE
POC reviewed w/ pt. AAOx4. VSS on RA. C/o cough treated w/ PRN medication per MAR. Pt voids and shifts in bed independently. No injuries, falls, or trauma occurred during shift. Purposeful rounding completed. Bed low and locked, side rails up x3, call light within reach.

## 2023-06-14 NOTE — PROGRESS NOTES
"[facility] Medicine  Progress Note    Patient Name: Talia Mueller  MRN: [id number]  Patient Class: IP- Inpatient   Admission Date: 6/10/2023  Length of Stay: 2 days  Attending Physician: Estelita Corona MD  Primary Care Provider: Camilla Lomas MD        Subjective:     Principal Problem:Acute hypoxemic respiratory failure        HPI:  From H&P by Dalila White PA:  "Ms. Talia Mueller is a 58 y.o. female, with PMH of COPD, asthma, obesity, GEOVANNI, anemia, who [place] [facility] [place] due to shortness of breath x 4 days. She tried using her albuterol without relief. She denied fever. EMS was called to bring her to the hospital. Upon arrival the was mildly hypotensive. She was treated with 1L IV fluids, and is now hypertensive, but her O2 sats have dropped. She was treated with DuoNebs, and steroids without significant improvement."      Overview/Hospital Course:  Patient presented with acute on chronic respiratory failure due to asthma/copd and was started on respiratory treatments with bronchodilators and IV steroids. Echo obtained to assess cardiac function showed grade I diastolic dysfunction.  Patient also had a severe cough that ultimately was treated with cough suppressants.       Interval History: She starts coughing and can't stop.  Has had mild hemoptysis with this.  Wheezing significantly.    Review of Systems   Constitutional:  Negative for chills and fever.   Respiratory:  Positive for cough, shortness of breath and wheezing.    Cardiovascular:  Negative for chest pain and palpitations.   Objective:     Vital Signs (Most Recent):  Temp: 97.9 °F (36.6 °C) (06/14/23 1603)  Pulse: 97 (06/14/23 1647)  Resp: 18 (06/14/23 1647)  BP: (!) 140/86 (06/14/23 1603)  SpO2: 98 % (06/14/23 1647) Vital Signs (24h Range):  Temp:  [97.4 °F (36.3 °C)-98.7 °F (37.1 °C)] 97.9 °F (36.6 °C)  Pulse:  [] 97  Resp:  [18-20] 18  SpO2:  [94 %-99 %] 98 %  BP: (140-174)/(80-99) " 140/86     Weight: 98.2 kg (216 lb 7.9 oz)  Body mass index is 34.94 kg/m².    Intake/Output Summary (Last 24 hours) at 6/14/2023 1721  Last data filed at 6/14/2023 1325  Gross per 24 hour   Intake 1811.55 ml   Output --   Net 1811.55 ml         Physical Exam  Constitutional:       General: She is in acute distress.   HENT:      Nose: Congestion present.   Cardiovascular:      Rate and Rhythm: Normal rate and regular rhythm.      Heart sounds: Normal heart sounds. No murmur heard.    No gallop.   Pulmonary:      Breath sounds: Wheezing and rhonchi present.      Comments: Coughing.  Expiratory wheezing noted  Abdominal:      General: Bowel sounds are normal.      Palpations: Abdomen is soft.   Skin:     General: Skin is warm and dry.   Neurological:      General: No focal deficit present.      Mental Status: She is alert.   Psychiatric:         Mood and Affect: Mood normal.         Behavior: Behavior normal.           Significant Labs: All pertinent labs within the past 24 hours have been reviewed.    Significant Imaging: I have reviewed all pertinent imaging results/findings within the past 24 hours.      Assessment/Plan:      * Acute hypoxemic respiratory failure  - Ms. Talia Mueller presents with acute respiratory failure 2/2 asthmatic bronchitis exacerbation   - Patient with Hypoxic Respiratory failure which is Acute.  she is not on home oxygen but requiring oxygen here   - Signs/symptoms of respiratory failure include- tachypnea and increased work of breathing. Contributing diagnoses includes - COPD and Obesity Hypoventilation Labs and images were reviewed. Will treat underlying causes and adjust management of respiratory failure.   - COPD Exacerbation pathways initiated    - starting antibiotics given increased cough, increased sputum and change in sputum color    - will administer steroids IV [id number] due to respiratory distress and recent antibiotic use (1 month ago)    - Cough  "suppressant      Continue scheduled bronchodilators and home inhalers  Continue steroids.  Change to oral daily dose  Continue levaquin while sputum cultures pending  Echo grade I diastolic dysfunction  Maintains a chronic hoarse voice following her tracheostomy which was decannulated in 2022  On 2L with good sats but increased WOB.  Need to wean (vs plan for home oxygen)  99 pack year history of smoking, quit 2016    Asthmatic bronchitis with acute exacerbation  As above in "acute hypoxemic respiratory failure"       Essential hypertension  - chronic   - home meds: amlodipine 10 mg QD, losartan 100 mg QD    Has remained very hypertensive while here, likely not helped with significant beta agonist and steroid use.  Added HCTZ, continue prn hydralazine as needed with caution for HR.     Anemia  - H&H stable, at baseline  - monitor      Morbid obesity  Body mass index is 34.94 kg/m². Morbid obesity complicates all aspects of disease management from diagnostic modalities to treatment. Weight loss encouraged and health benefits explained to patient.         GEOVANNI (obstructive sleep apnea)  - CPAP ordered but patient refuses to use it.      COPD exacerbation  As above in "acute hypoxemic respiratory failure"   99 pack year history of smoking, quit 2016      VTE Risk Mitigation (From admission, onward)         Ordered     heparin (porcine) injection 5,000 Units  Every 8 hours         06/11/23 0627     IP VTE HIGH RISK PATIENT  Once         06/11/23 0627     Place sequential compression device  Until discontinued         06/11/23 0123                Discharge Planning   ITA:      Code Status: Full Code   Is the patient medically ready for discharge?:     Reason for patient still in hospital (select all that apply): Patient unstable, Patient trending condition and Treatment  Discharge Plan A: Home Health                  Estelita Estevez MD  Department of Hospital Medicine   [facility]  "

## 2023-06-14 NOTE — ASSESSMENT & PLAN NOTE
- Ms. Talia Mueller presents with acute respiratory failure 2/2 asthmatic bronchitis exacerbation   - Patient with Hypoxic Respiratory failure which is Acute.  she is not on home oxygen but requiring oxygen here   - Signs/symptoms of respiratory failure include- tachypnea and increased work of breathing. Contributing diagnoses includes - COPD and Obesity Hypoventilation Labs and images were reviewed. Will treat underlying causes and adjust management of respiratory failure.   - COPD Exacerbation pathways initiated    - starting antibiotics given increased cough, increased sputum and change in sputum color    - will administer steroids IV [id number] due to respiratory distress and recent antibiotic use (1 month ago)    - Cough suppressant      Continue scheduled bronchodilators and home inhalers  Continue steroids.  Change to oral daily dose  Continue levaquin while sputum cultures pending  Echo grade I diastolic dysfunction  Maintains a chronic hoarse voice following her tracheostomy which was decannulated in 2022  On 2L with good sats but increased WOB.  Need to wean (vs plan for home oxygen)  99 pack year history of smoking, quit 2016

## 2023-06-14 NOTE — RESEARCH
Sponsor: Dr. Gee Yoder M.D.    Study Title/IRB Number: A computer vision approach to prevention of injury from falling  IRB #: 2020.256    Principle Investigator: Gee Yoder M.D.    Present for Discussion: Yes/Patient only     Is LAR Consenting for Subject: No    Prior to the Informed Consent (IC) being signed, or any study protocol required data collection, testing, procedure, or intervention being performed, the following was done and/or discussed:  Patient was given a copy of the IC for review   Purpose of the study and qualifications to participate   Study design, follow up schedule, and tests or procedures done at each visit  Confidentiality and HIPAA Authorization for Release of Medical Records for the research trial/ subject's rights/research related injury  Risk, Benefits, Alternative Treatments, Compensation and Costs  Participation in the research trial is voluntary and patient may withdraw at anytime  Contact information for study related questions    Patient verbalizes understanding of the above: Yes  Contact information for CRC and PI given to patient: Yes  Patient able to adequately summarize: the purpose of the study, the risks associated with the study, and all procedures, testing, and follow-ups associated with the study: Yes    Patient signed the informed consent form for the A Computer Vision Approach to Prevent Injury From Falling research study with an IRB approval date of 07/09/2020.  Each page of the consent form was reviewed with patient and all questions answered satisfactorily. Patient signed the consent form and received a copy of same. The original consent was scanned into electronic medical records (EPIC) and filed into the subject's research study chart.    Ms. Mueller was able to complete the following upon entry of the study:    - Subject was outfitted with white and black checkered gown: No  - Subject understands that they must wear the white and black checkered gown  for the entirety of the 24 hour observation period: No   - Subject understands that visitors and staff will also be recorded while in the view of visual recording equipment: Yes  - Subject was able to read consent and understands that they'll only be visually recorded for 24 hours and not audio recorded: Yes  -Subject states that they understand that this is an investigational study and that the WOW (Workstation on Wheels) doesn't prevent or lower risks of falls or injuries, and that they should always follow their provider's orders and use their call bell to alert hospital staff before ambulating or becoming mobile: Yes    Dr. Gee Yoder M.D. has reviewed the following inclusion/ exclusion criteria and confirms that subject meets all Inclusion and no Exclusion criteria at this time:      Inclusion-   - Subject is currently admitted as an inpatient with acute care needs to [facility] and is at risk for falling.  - Subject is awake, alert, oriented and can speak and understand English without        difficulty.  - Subject can stand and ambulate with or without assistance.  - Subject must be literate.  - Subject must have a BMI that allows for proper fit of white and black      checkered gown.  - Subject must be able to provide informed consent.  - Subject will be admitted for > 24 hours.    Exclusion-  - Subject cannot read.  - Subject is immobile e.g. (paralyzed)  - Subject has BMI that prevents them from properly fitting in white and black checkered      gown.  - Subject has COVID-19 or other airborne infectious diseases.  - Subject is on reverse-isolation for immunosuppressive diseases.  - Subject has altered mental status or diagnosis of dementia.  - Subject is expected to be discharged in < 24 hours.    Pt AAO x 4, lying on left side in bed with HOB elevated X 25 degrees. Pt experiencing exacerbation of cough with external wheezing noted. SaO2 98% on room air. Pt is  expectorating scant hemoptysis. Pt is requesting a breathing treatment. Pt assisted to sit up in high-fowlers position; severe coughing alleviated. KARLEY Rizo notified of pt condition. Pt expresses mild anxiety. Pt reminded that WOW and video recording is not monitored and provides no emergency benefits or reduction or elimination from the risk of injury from falls; pt voiced understanding. Pt reminded to follow plan of care put forth by provider and to call RN for all assistance to reduce risk of problematic situations and potential injuries; pt voiced understanding. Recording started without incident. Advised pt that their participation is voluntary and if for any reasons they decide to cease the study that they only needed to inform their nurse and study session would be stopped; pt voiced understanding. Call bell within reach. Charge nurse notified of continuous video monitoring and of other study initiatives.

## 2023-06-14 NOTE — ASSESSMENT & PLAN NOTE
- chronic   - home meds: amlodipine 10 mg QD, losartan 100 mg QD    Has remained very hypertensive while here, likely not helped with significant beta agonist and steroid use.  Added HCTZ, continue prn hydralazine as needed with caution for HR.

## 2023-06-15 PROCEDURE — 99233 SBSQ HOSP IP/OBS HIGH 50: CPT | Mod: ,,, | Performed by: HOSPITALIST

## 2023-06-15 PROCEDURE — 63600175 PHARM REV CODE 636 W HCPCS: Performed by: PHYSICIAN ASSISTANT

## 2023-06-15 PROCEDURE — 99233 PR SUBSEQUENT HOSPITAL CARE,LEVL III: ICD-10-PCS | Mod: ,,, | Performed by: HOSPITALIST

## 2023-06-15 PROCEDURE — 94664 DEMO&/EVAL PT USE INHALER: CPT

## 2023-06-15 PROCEDURE — 27000221 HC OXYGEN, UP TO 24 HOURS

## 2023-06-15 PROCEDURE — 25000242 PHARM REV CODE 250 ALT 637 W/ HCPCS: Performed by: HOSPITALIST

## 2023-06-15 PROCEDURE — 92610 EVALUATE SWALLOWING FUNCTION: CPT

## 2023-06-15 PROCEDURE — 63600175 PHARM REV CODE 636 W HCPCS: Performed by: HOSPITALIST

## 2023-06-15 PROCEDURE — 94761 N-INVAS EAR/PLS OXIMETRY MLT: CPT

## 2023-06-15 PROCEDURE — 94640 AIRWAY INHALATION TREATMENT: CPT

## 2023-06-15 PROCEDURE — 99223 PR INITIAL HOSPITAL CARE,LEVL III: ICD-10-PCS | Mod: ,,, | Performed by: INTERNAL MEDICINE

## 2023-06-15 PROCEDURE — 99900035 HC TECH TIME PER 15 MIN (STAT)

## 2023-06-15 PROCEDURE — 25000003 PHARM REV CODE 250: Performed by: PHYSICIAN ASSISTANT

## 2023-06-15 PROCEDURE — 99223 1ST HOSP IP/OBS HIGH 75: CPT | Mod: ,,, | Performed by: INTERNAL MEDICINE

## 2023-06-15 PROCEDURE — 25000003 PHARM REV CODE 250: Performed by: INTERNAL MEDICINE

## 2023-06-15 PROCEDURE — 25000242 PHARM REV CODE 250 ALT 637 W/ HCPCS: Performed by: INTERNAL MEDICINE

## 2023-06-15 PROCEDURE — A4216 STERILE WATER/SALINE, 10 ML: HCPCS | Performed by: PHYSICIAN ASSISTANT

## 2023-06-15 PROCEDURE — 27000190 HC CPAP FULL FACE MASK W/VALVE

## 2023-06-15 PROCEDURE — 25000003 PHARM REV CODE 250: Performed by: HOSPITALIST

## 2023-06-15 PROCEDURE — 94799 UNLISTED PULMONARY SVC/PX: CPT

## 2023-06-15 PROCEDURE — 94660 CPAP INITIATION&MGMT: CPT

## 2023-06-15 PROCEDURE — 21400001 HC TELEMETRY ROOM

## 2023-06-15 RX ORDER — CETIRIZINE HYDROCHLORIDE 10 MG/1
10 TABLET ORAL NIGHTLY
Status: DISCONTINUED | OUTPATIENT
Start: 2023-06-15 | End: 2023-06-17 | Stop reason: HOSPADM

## 2023-06-15 RX ORDER — LEVALBUTEROL 1.25 MG/.5ML
1.25 SOLUTION, CONCENTRATE RESPIRATORY (INHALATION) EVERY 8 HOURS
Status: DISCONTINUED | OUTPATIENT
Start: 2023-06-15 | End: 2023-06-17 | Stop reason: HOSPADM

## 2023-06-15 RX ORDER — FLUTICASONE PROPIONATE 50 MCG
2 SPRAY, SUSPENSION (ML) NASAL DAILY
Status: DISCONTINUED | OUTPATIENT
Start: 2023-06-16 | End: 2023-06-17 | Stop reason: HOSPADM

## 2023-06-15 RX ADMIN — Medication 10 ML: at 03:06

## 2023-06-15 RX ADMIN — TAMSULOSIN HYDROCHLORIDE 0.4 MG: 0.4 CAPSULE ORAL at 08:06

## 2023-06-15 RX ADMIN — GABAPENTIN 100 MG: 100 CAPSULE ORAL at 08:06

## 2023-06-15 RX ADMIN — IPRATROPIUM BROMIDE AND ALBUTEROL SULFATE 3 ML: .5; 3 SOLUTION RESPIRATORY (INHALATION) at 12:06

## 2023-06-15 RX ADMIN — LEVALBUTEROL HYDROCHLORIDE 1.25 MG: 1.25 SOLUTION, CONCENTRATE RESPIRATORY (INHALATION) at 11:06

## 2023-06-15 RX ADMIN — PROMETHAZINE HYDROCHLORIDE AND CODEINE PHOSPHATE 10 ML: 6.25; 1 SOLUTION ORAL at 10:06

## 2023-06-15 RX ADMIN — PREDNISONE 40 MG: 20 TABLET ORAL at 08:06

## 2023-06-15 RX ADMIN — FLUTICASONE FUROATE AND VILANTEROL TRIFENATATE 1 PUFF: 200; 25 POWDER RESPIRATORY (INHALATION) at 07:06

## 2023-06-15 RX ADMIN — ATORVASTATIN CALCIUM 40 MG: 20 TABLET, FILM COATED ORAL at 08:06

## 2023-06-15 RX ADMIN — AMLODIPINE BESYLATE 10 MG: 5 TABLET ORAL at 08:06

## 2023-06-15 RX ADMIN — GUAIFENESIN 600 MG: 600 TABLET, EXTENDED RELEASE ORAL at 08:06

## 2023-06-15 RX ADMIN — IPRATROPIUM BROMIDE AND ALBUTEROL SULFATE 3 ML: .5; 3 SOLUTION RESPIRATORY (INHALATION) at 04:06

## 2023-06-15 RX ADMIN — GABAPENTIN 100 MG: 100 CAPSULE ORAL at 02:06

## 2023-06-15 RX ADMIN — RISPERIDONE 1 MG: 1 TABLET ORAL at 08:06

## 2023-06-15 RX ADMIN — PROMETHAZINE HYDROCHLORIDE AND CODEINE PHOSPHATE 10 ML: 6.25; 1 SOLUTION ORAL at 05:06

## 2023-06-15 RX ADMIN — TIOTROPIUM BROMIDE INHALATION SPRAY 2 PUFF: 3.12 SPRAY, METERED RESPIRATORY (INHALATION) at 07:06

## 2023-06-15 RX ADMIN — IPRATROPIUM BROMIDE AND ALBUTEROL SULFATE 3 ML: .5; 3 SOLUTION RESPIRATORY (INHALATION) at 07:06

## 2023-06-15 RX ADMIN — SUCRALFATE 1 G: 1 TABLET ORAL at 10:06

## 2023-06-15 RX ADMIN — Medication 10 ML: at 10:06

## 2023-06-15 RX ADMIN — HEPARIN SODIUM 5000 UNITS: 5000 INJECTION INTRAVENOUS; SUBCUTANEOUS at 10:06

## 2023-06-15 RX ADMIN — LOSARTAN POTASSIUM 100 MG: 50 TABLET, FILM COATED ORAL at 08:06

## 2023-06-15 RX ADMIN — HEPARIN SODIUM 5000 UNITS: 5000 INJECTION INTRAVENOUS; SUBCUTANEOUS at 05:06

## 2023-06-15 RX ADMIN — TRAMADOL HYDROCHLORIDE 50 MG: 50 TABLET, COATED ORAL at 02:06

## 2023-06-15 RX ADMIN — MIRTAZAPINE 15 MG: 15 TABLET, FILM COATED ORAL at 08:06

## 2023-06-15 RX ADMIN — CETIRIZINE HYDROCHLORIDE 10 MG: 10 TABLET, FILM COATED ORAL at 08:06

## 2023-06-15 RX ADMIN — SUCRALFATE 1 G: 1 TABLET ORAL at 02:06

## 2023-06-15 RX ADMIN — Medication 10 ML: at 05:06

## 2023-06-15 RX ADMIN — CHLORTHALIDONE 25 MG: 25 TABLET ORAL at 08:06

## 2023-06-15 RX ADMIN — LEVALBUTEROL HYDROCHLORIDE 1.25 MG: 1.25 SOLUTION, CONCENTRATE RESPIRATORY (INHALATION) at 04:06

## 2023-06-15 RX ADMIN — CLONAZEPAM 0.5 MG: 0.5 TABLET ORAL at 10:06

## 2023-06-15 RX ADMIN — SUCRALFATE 1 G: 1 TABLET ORAL at 08:06

## 2023-06-15 RX ADMIN — LEVOFLOXACIN 750 MG: 750 INJECTION, SOLUTION INTRAVENOUS at 08:06

## 2023-06-15 RX ADMIN — SUCRALFATE 1 G: 1 TABLET ORAL at 05:06

## 2023-06-15 RX ADMIN — HEPARIN SODIUM 5000 UNITS: 5000 INJECTION INTRAVENOUS; SUBCUTANEOUS at 02:06

## 2023-06-15 NOTE — PROCEDURES
Pt. Sat's on room air at rest was 96%the patient walked for 2 min and stated that she needed to go back to room due to feeling dizzy. Pt room air Sat's at 2 min of walking was 92%.

## 2023-06-15 NOTE — PT/OT/SLP EVAL
"Speech Language Pathology Evaluation  Bedside Swallow    Patient Name:  Talia Mueller   MRN:  [id number]  Admitting Diagnosis: Acute hypoxemic respiratory failure    Recommendations:                 General Recommendations:  SLP to continue to follow 3-5x/week for ongoing assessment and treatment of swallowing and voice. Recommend ENT Evaluation     Diet recommendations: Solids: Regular Diet - IDDSI Level 7, Regular: Thin     Aspiration Precautions: 1 bite/sip at a time, Monitor for s/s of aspiration, Remain upright 30 minutes post meal, and Small bites/sips     General Precautions: Standard, aspiration    Communication strategies:  ask pt to speak slow and repeat self if unable to understanding     Assessment:     Talia Mueller is a 58 y.o. female with an SLP diagnosis of voice disorder and intermittent dysphagia per patient. SLP to continue to follow with recommendation of ENT evaluation.     History:     HPI:  From H&P by Dalila MASON:  "Ms. Talia Mueller is a 58 y.o. female, with PMH of COPD, asthma, obesity, GEOVANNI, anemia, who presented [place] [facility] [place] on [place]10/23 due to shortness of breath x 4 days. She tried using her albuterol without relief. She denied fever. EMS was called to bring her to the hospital. Upon arrival the was mildly hypotensive. She was treated with 1L IV fluids, and is now hypertensive, but her O2 sats have dropped. She was treated with DuoNebs, and steroids without significant improvement."    Overview/Hospital Course:  Patient presented with acute on chronic respiratory failure due to asthma/copd and was started on respiratory treatments with bronchodilators and IV steroids. Echo obtained to assess cardiac function showed grade I diastolic dysfunction.  Patient also had a severe cough that ultimately was treated with cough suppressants.      Interval History: She starts coughing and can't stop.  Has had mild hemoptysis with this.  Wheezing significantly.    Past Medical " History:   Diagnosis Date    Anxiety     Asthma     Bronchitis     COPD (chronic obstructive pulmonary disease)     Depression     GERD (gastroesophageal reflux disease)     Hypertension     Schizophrenia        Past Surgical History:   Procedure Laterality Date    HERNIA REPAIR      none         Chest X-Rays: no acute process seen     Subjective     Pt awake, agreeable to SLP evaluation.     Pain/Comfort:   Ribs: 8/10 pain reported. Cessation of activity/repositioning. Pt medicated for pain ~30 mins prior to SLP eval  Back: 8/10 pain reported. Cessation of activity/repositioning. Pt medicated for pain ~30 mins prior to SLP eval    Respiratory Status: Room air    Objective:     Cognitive Communication Status: Pt awake, alert, cooperative. Oriented to person, place, date, time with 100% accuracy. Able to follow commands with 100% accuracy. Denies any recent changes in language or cognition.     Oral Musculature Evaluation  Oral Musculature: WNL  Dentition: scattered dentition  Secretion Management: adequate  Mucosal Quality: good  Mandibular Strength and Mobility: WNL  Oral Labial Strength and Mobility: WNL  Lingual Strength and Mobility: WNL  Velar Elevation: WNL  Voice Prior to PO Intake: strained/strangled/gravelly vocal quality  Oral Musculature Comments: Face is symetrical at rest and during smile. Lingual and labial strength and ROM appears to be WFL for speech and oral intake. Able to lateralize, elevate, retract tongue and protrusion is at midline. Speech is ~50% intelligible at the 3-4 word phrase level due to vocal quality.    Bedside Swallow Eval:   Consistencies Assessed:  Thin liquids water single sips via cup and sequential sips via straw   Puree 1/2tsp bites pudding   Solids single bites shauna arce       Oral Phase:   Lip seal, a-p transport, ability to form cohesive bolus WNL  Mastication of solids WNL  No oral residuals noted after the swallow     Pharyngeal Phase:   Trigger of pharyngeal swallow  "appears to be WNL  No overt s/s of airway threat or aspiration during intake of liquids, purees, or solids: no coughing, throat clearing, change in vocal quality  Pt with delayed dry cough after intake. Pt denies having increased s/s of aspiration with oral intake, states cough is present at baseline   Pt does note feeling a "tickle" in her throat and sometimes feels food gets caught close to where her throat tickles. Pt pointing to healed trach site.      Discussed recommendation of ENT evaluation prior to SLP treatment of voice disorder. Also discussed possible MBSS pending ENT evaluation due to pt feeling food is getting caught. Discussed attempting to wash food down with water and SLP will return to assess swallowing.     Goals:   Multidisciplinary Problems       SLP Goals          Problem: SLP    Goal Priority Disciplines Outcome   SLP Goal     SLP Ongoing, Progressing   Description: 1. Pt will be able to consume regular solids and thin liquids without overt s/s of airway threat or aspiration.   2. Ongoing voice evaluation pending ENT consult                        Plan:     Patient to be seen:  3 x/week, 5 x/week   Plan of Care expires:  06/22/23  Plan of Care reviewed with:  patient   SLP Follow-Up:  Yes       Discharge recommendations:  other (see comments) (TBD)       Time Tracking:     SLP Treatment Date:   06/15/23  Speech Start Time:  1530  Speech Stop Time:  1556     Speech Total Time (min):  26 min    Billable Minutes: Eval Swallow and Oral Function 26 mins    06/15/2023         "

## 2023-06-15 NOTE — PROGRESS NOTES
Pt. Sat's on room air was 97% Pt received her aerosol and mdi treatments as ordered.  After receiving treatments patient flopped back in the bed and stated that she felt like she was passing out.PHILIP Chaves was called to bedside. Vitals were taken and stable.

## 2023-06-15 NOTE — ASSESSMENT & PLAN NOTE
- chronic   - home meds: amlodipine 10 mg QD, losartan 100 mg QD    Has remained very hypertensive while here, likely not helped with significant beta agonist and steroid use.  Added HCTZ, continue prn hydralazine as needed with caution for HR.   Improving as cough resolves

## 2023-06-15 NOTE — PROGRESS NOTES
"[facility] Medicine  Progress Note    Patient Name: Talia Mueller  MRN: [id number]  Patient Class: IP- Inpatient   Admission Date: 6/10/2023  Length of Stay: 3 days  Attending Physician: Estelita Corona MD  Primary Care Provider: Camilla Lomas MD        Subjective:     Principal Problem:Acute hypoxemic respiratory failure        HPI:  From H&P by Dalila White PA:  "Ms. Talia Mueller is a 58 y.o. female, with PMH of COPD, asthma, obesity, GEOVANNI, anemia, who presented to [facility] ED on 6/10/23 due to shortness of breath x 4 days. She tried using her albuterol without relief. She denied fever. EMS was called to bring her to the hospital. Upon arrival the was mildly hypotensive. She was treated with 1L IV fluids, and is now hypertensive, but her O2 sats have dropped. She was treated with DuoNebs, and steroids without significant improvement."      Overview/Hospital Course:  Patient presented with acute on chronic respiratory failure due to asthma/copd and was started on respiratory treatments with bronchodilators and IV steroids. Echo obtained to assess cardiac function showed grade I diastolic dysfunction.  Patient also had a severe cough that ultimately was treated with cough suppressants.     Pulmonary consulted given her failure to improve, severe cough, vocal cord dysfunction and recent tracheostomy/decannulation.  Recommended evaluation for tracheal stenosis with neck CT and ENT consultation.      Interval History: Again difficult to hold a conversation as she coughs so hard she chokes.  She became dizzy and weak during a respiratory treatment, gets tachycardic with treatments and has a history of vertigo, asks to cut down on treatments.  Still wheezing.    Review of Systems   Constitutional:  Negative for chills and fever.   Respiratory:  Positive for cough, shortness of breath and wheezing.    Cardiovascular:  Negative for chest pain and palpitations.   Objective: "     Vital Signs (Most Recent):  Temp: 98.4 °F (36.9 °C) (06/15/23 0808)  Pulse: 97 (06/15/23 1646)  Resp: 18 (06/15/23 1646)  BP: 106/73 (06/15/23 1137)  SpO2: 95 % (06/15/23 1646) Vital Signs (24h Range):  Temp:  [98 °F (36.7 °C)-98.5 °F (36.9 °C)] 98.4 °F (36.9 °C)  Pulse:  [] 97  Resp:  [16-20] 18  SpO2:  [95 %-99 %] 95 %  BP: (106-142)/() 106/73     Weight: 98.8 kg (217 lb 13 oz)  Body mass index is 35.16 kg/m².    Intake/Output Summary (Last 24 hours) at 6/15/2023 1717  Last data filed at 6/15/2023 1645  Gross per 24 hour   Intake 2198.52 ml   Output --   Net 2198.52 ml         Physical Exam  Constitutional:       General: She is in acute distress.   HENT:      Nose: Congestion present.      Mouth/Throat:      Comments: Hoarse  Cardiovascular:      Rate and Rhythm: Regular rhythm. Tachycardia present.      Heart sounds: Normal heart sounds. No murmur heard.    No gallop.   Pulmonary:      Effort: Pulmonary effort is normal.      Breath sounds: Normal breath sounds.      Comments: Coughing.  Expiratory wheezing noted  Abdominal:      General: Bowel sounds are normal.      Palpations: Abdomen is soft.   Skin:     General: Skin is warm and dry.   Neurological:      General: No focal deficit present.      Mental Status: She is alert.   Psychiatric:         Mood and Affect: Mood normal.         Behavior: Behavior normal.           Significant Labs: All pertinent labs within the past 24 hours have been reviewed.    Significant Imaging: I have reviewed all pertinent imaging results/findings within the past 24 hours.      Assessment/Plan:      * Acute hypoxemic respiratory failure  - Ms. Talia Mueller presents with acute respiratory failure 2/2 asthmatic bronchitis exacerbation   - Patient with Hypoxic Respiratory failure which is Acute.  she is not on home oxygen but requiring oxygen here   - Signs/symptoms of respiratory failure include- tachypnea and increased work of breathing. Contributing  "diagnoses includes - COPD and Obesity Hypoventilation Labs and images were reviewed. Will treat underlying causes and adjust management of respiratory failure.   - COPD Exacerbation pathways initiated    - starting antibiotics given increased cough, increased sputum and change in sputum color    - will administer steroids IV [id number] due to respiratory distress and recent antibiotic use (1 month ago)    - Cough suppressant      Continue scheduled bronchodilators and home inhalers  Continue steroids.  Change to oral daily dose  Continue levaquin while sputum cultures pending  Echo grade I diastolic dysfunction  Maintains a chronic hoarse voice following her tracheostomy which was decannulated in 2022  On 2L with good sats but increased WOB.  Need to wean (vs plan for home oxygen)  99 pack year history of smoking, quit 2016  Passed a 6MWT  Pulmonary consult - patient not improving, history of trach    Asthmatic bronchitis with acute exacerbation  As above in "acute hypoxemic respiratory failure"       Essential hypertension  - chronic   - home meds: amlodipine 10 mg QD, losartan 100 mg QD    Has remained very hypertensive while here, likely not helped with significant beta agonist and steroid use.  Added HCTZ, continue prn hydralazine as needed with caution for HR.   Improving as cough resolves    Anemia  - H&H stable, at baseline  - monitor      Morbid obesity  Body mass index is 34.94 kg/m². Morbid obesity complicates all aspects of disease management from diagnostic modalities to treatment. Weight loss encouraged and health benefits explained to patient.         GEOVANNI (obstructive sleep apnea)  - CPAP ordered but patient refuses to use it.      COPD exacerbation  As above in "acute hypoxemic respiratory failure"   99 pack year history of smoking, quit 2016      VTE Risk Mitigation (From admission, onward)         Ordered     heparin (porcine) injection 5,000 Units  Every 8 hours         06/11/23 0627     IP VTE HIGH " RISK PATIENT  Once         06/11/23 0627     Place sequential compression device  Until discontinued         06/11/23 0123                Discharge Planning   ITA:      Code Status: Full Code   Is the patient medically ready for discharge?:     Reason for patient still in hospital (select all that apply): Patient unstable, Patient trending condition, Treatment, Imaging and Consult recommendations  Discharge Plan A: Home Health                  Estelita Estevez MD  Department of Hospital Medicine   [facility]

## 2023-06-15 NOTE — PLAN OF CARE
Problem: SLP  Goal: SLP Goal  Description: 1. Pt will be able to consume regular solids and thin liquids without overt s/s of airway threat or aspiration.   2. Ongoing voice evaluation pending ENT consult   Outcome: Ongoing, Progressing       SLP to continue to follow

## 2023-06-15 NOTE — NURSING
CT neck unable to be done at this time due to pt c/o unable to lay flat on CT table once in CT. Hospital medicine made aware.

## 2023-06-15 NOTE — PLAN OF CARE
Pt remained free of falls and injuries throughout shift. AAOx4. Pt calm and cooperative. Purposeful hourly rounding performed. Pt swallows meds whole. IV flushed and saline locked. Managed c/o pain from coughing and HA with PRN tramadol. Managing cough with PRN promethazine-codeine cough syrup. Patient ambulates independently to toilet. VSS on 2L O2 NC PRN for SOB with exertion, doing well resting on RA. Pt resting comfortably in bed, denies pain, denies needs at this time. Bed low and locked, call light in reach. Side rails up x2. Will continue to monitor.

## 2023-06-15 NOTE — NURSING
Notified by RT that patient voiced feeling like she was about to pass out while sitting on side the bed after receiving breathing treatment. Upon assessment of patient, she is awake and alert and attempting to eat breakfast. Patient denies pain/discomfort/SOB and NADN at this time. Vitals taken and stable on 2L NC. Gwen charge nurse and RT at bedside. Dr. Corona made aware of the episode. No new orders at this time. Patient advised to call for assistance when getting needing to get out of bed. Care ongoing.

## 2023-06-15 NOTE — ASSESSMENT & PLAN NOTE
- Ms. Talia Mueller presents with acute respiratory failure 2/2 asthmatic bronchitis exacerbation   - Patient with Hypoxic Respiratory failure which is Acute.  she is not on home oxygen but requiring oxygen here   - Signs/symptoms of respiratory failure include- tachypnea and increased work of breathing. Contributing diagnoses includes - COPD and Obesity Hypoventilation Labs and images were reviewed. Will treat underlying causes and adjust management of respiratory failure.   - COPD Exacerbation pathways initiated    - starting antibiotics given increased cough, increased sputum and change in sputum color    - will administer steroids IV [id number] due to respiratory distress and recent antibiotic use (1 month ago)    - Cough suppressant      Continue scheduled bronchodilators and home inhalers  Continue steroids.  Change to oral daily dose  Continue levaquin while sputum cultures pending  Echo grade I diastolic dysfunction  Maintains a chronic hoarse voice following her tracheostomy which was decannulated in 2022  On 2L with good sats but increased WOB.  Need to wean (vs plan for home oxygen)  99 pack year history of smoking, quit 2016  Passed a 6MWT  Pulmonary consult - patient not improving, history of trach

## 2023-06-15 NOTE — SUBJECTIVE & OBJECTIVE
Interval History: Again difficult to hold a conversation as she coughs so hard she chokes.  She became dizzy and weak during a respiratory treatment, gets tachycardic with treatments and has a history of vertigo, asks to cut down on treatments.  Still wheezing.    Review of Systems   Constitutional:  Negative for chills and fever.   Respiratory:  Positive for cough, shortness of breath and wheezing.    Cardiovascular:  Negative for chest pain and palpitations.   Objective:     Vital Signs (Most Recent):  Temp: 98.4 °F (36.9 °C) (06/15/23 0808)  Pulse: 97 (06/15/23 1646)  Resp: 18 (06/15/23 1646)  BP: 106/73 (06/15/23 1137)  SpO2: 95 % (06/15/23 1646) Vital Signs (24h Range):  Temp:  [98 °F (36.7 °C)-98.5 °F (36.9 °C)] 98.4 °F (36.9 °C)  Pulse:  [] 97  Resp:  [16-20] 18  SpO2:  [95 %-99 %] 95 %  BP: (106-142)/() 106/73     Weight: 98.8 kg (217 lb 13 oz)  Body mass index is 35.16 kg/m².    Intake/Output Summary (Last 24 hours) at 6/15/2023 1717  Last data filed at 6/15/2023 1645  Gross per 24 hour   Intake 2198.52 ml   Output --   Net 2198.52 ml         Physical Exam  Constitutional:       General: She is in acute distress.   HENT:      Nose: Congestion present.      Mouth/Throat:      Comments: Hoarse  Cardiovascular:      Rate and Rhythm: Regular rhythm. Tachycardia present.      Heart sounds: Normal heart sounds. No murmur heard.    No gallop.   Pulmonary:      Effort: Pulmonary effort is normal.      Breath sounds: Normal breath sounds.      Comments: Coughing.  Expiratory wheezing noted  Abdominal:      General: Bowel sounds are normal.      Palpations: Abdomen is soft.   Skin:     General: Skin is warm and dry.   Neurological:      General: No focal deficit present.      Mental Status: She is alert.   Psychiatric:         Mood and Affect: Mood normal.         Behavior: Behavior normal.           Significant Labs: All pertinent labs within the past 24 hours have been reviewed.    Significant Imaging:  I have reviewed all pertinent imaging results/findings within the past 24 hours.

## 2023-06-15 NOTE — CONSULTS
"Consult Note  Pulmonary & Critical Care Medicine    Attending: Haja Saini  Fellow: Linsey Lovell  Admit Date: 6/10/2023  Today's Date: 06/15/2023  Reason for Consult:  Previous hx of trach/SOB/cough    SUBJECTIVE:     HPI: Ms. Mueller is a 57yo AAF with a PMH of COPD, asthma, OHS/GEOVANNI, and previous trach who presented to this facility 6/10 with complaints of SOB for 4 days. She has been treated for COPD/asthma exacerbation with steroids and nebulizer treatments. TTE on 6/12/2023 revealed LVEF 65% with G1DD. PAP was 21 not indicative for PH. Per primary, she has been complaining of having the feeling of something stuck in her throat and requesting someone to stick something down her throat to relieve the feeling. She has been experieincing a significant cough that is not improving.     In speaking with her, she states that she was admitted to [facility] last year in October for severe anaphylaxis where she was intubated and subsequently trached. Trach accidentally came out in January 2023 and it was unable to be replaced. Since that time, she has not had a trach and only used supplemental O2 intermittently and has not been monitoring her O2 levels. She was not seen by ENT 2/2 insurance issues. She experiences what she describes as a "tickling in her throat" that causes her to have severe coughing fits. These coughing fits often result in her blacking out. She is not on O2 during these episodes. Her coughing also causes chest pain. She also has a CPAP machine she is required to use, but she has only used it 4 times in the past 2 months.     She is unable to walk longer than a half a block prior to becoming short of breath. She is unable to to do her own grocery shopping 2/2 SOB. She describes wheezing ans dhoarseness that is ongoing. She follows with [facility] pulmonology. She has an 80 pack year history and quit smoking several months ago.     Review of patient's allergies indicates:   Allergen Reactions    " "Aspirin Anaphylaxis    Dilaudid [hydromorphone (bulk)] Other (See Comments)     Pt states dilaudid "makes me paranoid - real spooked". Denies s/s anaphylaxis.     Shellfish containing products Hives       Past Medical History:   Diagnosis Date    Anxiety     Asthma     Bronchitis     COPD (chronic obstructive pulmonary disease)     Depression     GERD (gastroesophageal reflux disease)     Hypertension     Schizophrenia      Past Surgical History:   Procedure Laterality Date    HERNIA REPAIR      none       Family History   Problem Relation Age of Onset    Hypertension Mother     Pneumonia Mother     Hypertension Father     Cancer Father     Diabetes Father      Social History     Tobacco Use    Smoking status: Former     Packs/day: 3.00     Types: Cigarettes     Quit date: 2016     Years since quittin.9    Smokeless tobacco: Never    Tobacco comments:     Smoked up to 3 ppd for 33 years.   Substance Use Topics    Alcohol use: Yes     Comment: occassional    Drug use: No       All medications reviewed.    Review of Systems   Constitutional:  Negative for chills, fever, malaise/fatigue and weight loss.   HENT:  Positive for congestion and sore throat. Negative for nosebleeds and sinus pain.    Eyes:  Positive for blurred vision. Negative for double vision and photophobia.   Respiratory:  Positive for cough, shortness of breath and wheezing. Negative for hemoptysis and sputum production.    Cardiovascular:  Positive for chest pain. Negative for palpitations and leg swelling.   Gastrointestinal:  Negative for abdominal pain, constipation, diarrhea, heartburn, nausea and vomiting.   Musculoskeletal:  Positive for back pain and joint pain. Negative for falls, myalgias and neck pain.   Skin:  Negative for itching and rash.   Neurological:  Positive for dizziness. Negative for loss of consciousness, weakness and headaches.   All other systems reviewed and are negative.    OBJECTIVE:     Vital Signs Trends/Hx " Reviewed  Vitals:    06/15/23 0700 06/15/23 0755 06/15/23 0808 06/15/23 1003   BP:   (!) 142/102    BP Location:       Patient Position:       Pulse:  110 (!) 112    Resp:  20  20   Temp:   98.4 °F (36.9 °C)    TempSrc:       SpO2:  97% 96%    Weight: 98.8 kg (217 lb 13 oz)      Height:           Physical Exam:  General: Morbidly obese AAF laying in bed in NAD, cooperative & interactive.  HEENT: AT/NC, PERRL, EOMI, oral and nasal mucosa moist.   Neck: Supple without JVD or palpable LAD. Stridor noted.  Cardiac: normal rate, regular rhythm, with no MRG with brisk cap refill and symmetric pulses in distal extremities.  Respiratory: Normal inspection. Symmetric chest rise. Normal palpation and percussion. Bilateral expiratory wheezing. No increased work of breathing noted.   Abdomen: Soft, NT/ND. +BS. Obese abdomen. No hepatosplenomegaly.   Extremities: No edema.   Neuro: Grossly intact to brief exam. Oriented x3 with appropriate mood/affect to situation.       Laboratory:  No results for input(s): PH, PCO2, PO2, HCO3, POCSATURATED, BE in the last 24 hours.  No results for input(s): WBC, RBC, HGB, HCT, PLT, MCV, MCH, MCHC in the last 24 hours.  No results for input(s): GLUCOSE, NA, K, CL, CO2, BUN, CREATININE, MG in the last 24 hours.    Invalid input(s):  CALCIUM    Microbiology Data:   Microbiology Results (last 7 days)       Procedure Component Value Units Date/Time    Culture, Respiratory with Gram Stain [[id number]] Collected: 23 1147    Order Status: Completed Specimen: Respiratory from Sputum Updated: 23 0824     Respiratory Culture Normal respiratory dayo      No S aureus or Pseudomonas isolated.     Gram Stain (Respiratory) <10 epithelial cells per low power field.     Gram Stain (Respiratory) Many WBC's     Gram Stain (Respiratory) Many Gram negative rods     Gram Stain (Respiratory) Many Gram positive cocci             Chest Imagin/10 CXR- no acute cardiopulmonary process      Scheduled  Medications:    amLODIPine  10 mg Oral Daily    atorvastatin  40 mg Oral Daily    chlorthalidone  25 mg Oral Daily    fluticasone furoate-vilanteroL  1 puff Inhalation Daily    gabapentin  100 mg Oral TID    guaiFENesin  600 mg Oral BID    heparin (porcine)  5,000 Units Subcutaneous Q8H    levalbuterol  1.25 mg Nebulization Q8H    levoFLOXacin  750 mg Intravenous Q24H    losartan  100 mg Oral Daily    mirtazapine  15 mg Oral Nightly    predniSONE  40 mg Oral Daily    risperiDONE  1 mg Oral BID    sodium chloride 0.9%  10 mL Intravenous Q8H    sucralfate  1 g Oral QID    tamsulosin  0.4 mg Oral Daily    tiotropium bromide  2 puff Inhalation Daily       PRN Medications:   acetaminophen, albuterol sulfate, clonazePAM, hydrALAZINE, melatonin, naloxone, promethazine-codeine 6.25-10 mg/5 ml, senna-docusate 8.6-50 mg, sodium chloride 0.9%, traMADoL    Assessment & Plan:   Patient Active Problem List   Diagnosis    Asthma    Status asthmaticus with COPD (chronic obstructive pulmonary disease)    Hypertensive urgency    Pneumonia of left lower lobe due to infectious organism    Acute hypoxemic respiratory failure    Asthmatic bronchitis with acute exacerbation    COPD exacerbation    Wheezing    Productive cough    Acute bronchitis    Constipation    GEOVANNI (obstructive sleep apnea)    Morbid obesity    Anemia    Essential hypertension       ASSESSMENT & RECOMMENDATIONS     #Acute COPD/Asthma Exacerbation  -Continue current treatment with Prednisone and nebulizer tx Q4  -If becoming tachycardic with albuterol, okay to try levalbuterol   -Continue Breo   -Not hypoxic on 6 min walk test.   -Currently completed 5 days of abx therapy. Ok to stop    #GEOVANNI on CPAP  -Patient not using her CPAP at home 2/2 mask fit. States she is willing to try hospital CPAP  -Recommend QHS CPAP starting at IP 7    #Chronic Allergic Rhinitis  -Recommend flonase and antihistamine    #Concern for tracheal stenosis  -Due to patient not being deemed  "appropriate for decannulation, but the trach dislodged on its own. There is concern for upper airway issue that initially prohibited her from being formally decannulated.  -Recommend CT neck to evaluate anatomy  -May require ENT evaluation for airway survey.     #Cough  -Appears to be related to feeling if irritation in her throat.  -Okay to continues guaifenesin.  -Okay to try tessalon perles  -See above "concern for tracheal stenosis"    Thank you for allowing us to participate in the care of this patient. We will continue to follow. Please call with questions.    Linsey Lovell M.D., PGY-V  U Pulmonary/Critical Care Fellow   "

## 2023-06-15 NOTE — ASSESSMENT & PLAN NOTE
Body mass index is 34.94 kg/m². Morbid obesity complicates all aspects of disease management from diagnostic modalities to treatment. Weight loss encouraged and health benefits explained to patient.        2 seconds or less 2 seconds or less

## 2023-06-15 NOTE — CARE UPDATE
06/14/23 1926   Patient Assessment/Suction   Level of Consciousness (AVPU) alert   Respiratory Effort Normal;Unlabored   Expansion/Accessory Muscles/Retractions no use of accessory muscles;no retractions;expansion symmetric   All Lung Fields Breath Sounds wheezes, expiratory   Rhythm/Pattern, Respiratory no shortness of breath reported   Cough Frequency frequent   Cough Type congested;dry   PRE-TX-O2   Device (Oxygen Therapy) room air   SpO2 99 %   Pulse Oximetry Type Intermittent   $ Pulse Oximetry - Multiple Charge Pulse Oximetry - Multiple   Pulse 96   Resp 18   Aerosol Therapy   $ Aerosol Therapy Charges Aerosol Treatment   Respiratory Treatment Status (SVN) given   Treatment Route (SVN) mask   Patient Position (SVN) HOB elevated   Post Treatment Assessment (SVN) increased aeration   Signs of Intolerance (SVN) none   Breath Sounds Post-Respiratory Treatment   Throughout All Fields Post-Treatment All Fields   Throughout All Fields Post-Treatment aeration increased   Post-treatment Heart Rate (beats/min) 97   Post-treatment Resp Rate (breaths/min) 18   Vibratory PEP Therapy   $ Vibratory PEP Charges Aerobika Therapy   $ Vibratory PEP Tech Time Charges 15 min   Type (PEP Therapy) vibratory/oscillatory   Device (PEP Therapy) flutter   Route (PEP Therapy) mouthpiece   Breaths per Cycle (PEP Therapy) 10   Cycles (PEP Therapy) 1   Settings (PEP Therapy) PEP 5   Patient Position (PEP Therapy) sitting on edge of bed   Post Treatment Assessment (PEP) breath sounds unchanged   Signs of Intolerance (PEP Therapy) none

## 2023-06-16 PROBLEM — J95.03 TRACHEAL STENOSIS DUE TO TRACHEOSTOMY: Status: ACTIVE | Noted: 2023-06-16

## 2023-06-16 PROCEDURE — 99900035 HC TECH TIME PER 15 MIN (STAT)

## 2023-06-16 PROCEDURE — 99233 PR SUBSEQUENT HOSPITAL CARE,LEVL III: ICD-10-PCS | Mod: ,,, | Performed by: HOSPITALIST

## 2023-06-16 PROCEDURE — 11000001 HC ACUTE MED/SURG PRIVATE ROOM

## 2023-06-16 PROCEDURE — 27000221 HC OXYGEN, UP TO 24 HOURS

## 2023-06-16 PROCEDURE — 94761 N-INVAS EAR/PLS OXIMETRY MLT: CPT

## 2023-06-16 PROCEDURE — 27000646 HC AEROBIKA DEVICE

## 2023-06-16 PROCEDURE — 25000242 PHARM REV CODE 250 ALT 637 W/ HCPCS: Performed by: HOSPITALIST

## 2023-06-16 PROCEDURE — 63600175 PHARM REV CODE 636 W HCPCS: Performed by: PHYSICIAN ASSISTANT

## 2023-06-16 PROCEDURE — 63600175 PHARM REV CODE 636 W HCPCS: Performed by: HOSPITALIST

## 2023-06-16 PROCEDURE — 25000003 PHARM REV CODE 250: Performed by: HOSPITALIST

## 2023-06-16 PROCEDURE — 94640 AIRWAY INHALATION TREATMENT: CPT

## 2023-06-16 PROCEDURE — 94664 DEMO&/EVAL PT USE INHALER: CPT

## 2023-06-16 PROCEDURE — 25000003 PHARM REV CODE 250: Performed by: PHYSICIAN ASSISTANT

## 2023-06-16 PROCEDURE — 99233 SBSQ HOSP IP/OBS HIGH 50: CPT | Mod: ,,, | Performed by: HOSPITALIST

## 2023-06-16 PROCEDURE — A4216 STERILE WATER/SALINE, 10 ML: HCPCS | Performed by: PHYSICIAN ASSISTANT

## 2023-06-16 PROCEDURE — 25000003 PHARM REV CODE 250: Performed by: INTERNAL MEDICINE

## 2023-06-16 RX ADMIN — HEPARIN SODIUM 5000 UNITS: 5000 INJECTION INTRAVENOUS; SUBCUTANEOUS at 09:06

## 2023-06-16 RX ADMIN — LEVALBUTEROL HYDROCHLORIDE 1.25 MG: 1.25 SOLUTION, CONCENTRATE RESPIRATORY (INHALATION) at 04:06

## 2023-06-16 RX ADMIN — GABAPENTIN 100 MG: 100 CAPSULE ORAL at 02:06

## 2023-06-16 RX ADMIN — HEPARIN SODIUM 5000 UNITS: 5000 INJECTION INTRAVENOUS; SUBCUTANEOUS at 05:06

## 2023-06-16 RX ADMIN — Medication 10 ML: at 05:06

## 2023-06-16 RX ADMIN — RISPERIDONE 1 MG: 1 TABLET ORAL at 08:06

## 2023-06-16 RX ADMIN — SUCRALFATE 1 G: 1 TABLET ORAL at 05:06

## 2023-06-16 RX ADMIN — RISPERIDONE 1 MG: 1 TABLET ORAL at 09:06

## 2023-06-16 RX ADMIN — Medication 10 ML: at 02:06

## 2023-06-16 RX ADMIN — HEPARIN SODIUM 5000 UNITS: 5000 INJECTION INTRAVENOUS; SUBCUTANEOUS at 01:06

## 2023-06-16 RX ADMIN — GABAPENTIN 100 MG: 100 CAPSULE ORAL at 08:06

## 2023-06-16 RX ADMIN — GUAIFENESIN 600 MG: 600 TABLET, EXTENDED RELEASE ORAL at 09:06

## 2023-06-16 RX ADMIN — TRAMADOL HYDROCHLORIDE 50 MG: 50 TABLET, COATED ORAL at 02:06

## 2023-06-16 RX ADMIN — PREDNISONE 40 MG: 20 TABLET ORAL at 09:06

## 2023-06-16 RX ADMIN — TIOTROPIUM BROMIDE INHALATION SPRAY 2 PUFF: 3.12 SPRAY, METERED RESPIRATORY (INHALATION) at 07:06

## 2023-06-16 RX ADMIN — FLUTICASONE PROPIONATE 100 MCG: 50 SPRAY, METERED NASAL at 09:06

## 2023-06-16 RX ADMIN — PROMETHAZINE HYDROCHLORIDE AND CODEINE PHOSPHATE 10 ML: 6.25; 1 SOLUTION ORAL at 11:06

## 2023-06-16 RX ADMIN — PROMETHAZINE HYDROCHLORIDE AND CODEINE PHOSPHATE 10 ML: 6.25; 1 SOLUTION ORAL at 02:06

## 2023-06-16 RX ADMIN — SUCRALFATE 1 G: 1 TABLET ORAL at 09:06

## 2023-06-16 RX ADMIN — SUCRALFATE 1 G: 1 TABLET ORAL at 08:06

## 2023-06-16 RX ADMIN — Medication 10 ML: at 09:06

## 2023-06-16 RX ADMIN — CHLORTHALIDONE 25 MG: 25 TABLET ORAL at 09:06

## 2023-06-16 RX ADMIN — GUAIFENESIN 600 MG: 600 TABLET, EXTENDED RELEASE ORAL at 08:06

## 2023-06-16 RX ADMIN — CETIRIZINE HYDROCHLORIDE 10 MG: 10 TABLET, FILM COATED ORAL at 08:06

## 2023-06-16 RX ADMIN — TAMSULOSIN HYDROCHLORIDE 0.4 MG: 0.4 CAPSULE ORAL at 09:06

## 2023-06-16 RX ADMIN — AMLODIPINE BESYLATE 10 MG: 5 TABLET ORAL at 09:06

## 2023-06-16 RX ADMIN — Medication 6 MG: at 11:06

## 2023-06-16 RX ADMIN — FLUTICASONE FUROATE AND VILANTEROL TRIFENATATE 1 PUFF: 200; 25 POWDER RESPIRATORY (INHALATION) at 07:06

## 2023-06-16 RX ADMIN — LEVALBUTEROL HYDROCHLORIDE 1.25 MG: 1.25 SOLUTION, CONCENTRATE RESPIRATORY (INHALATION) at 07:06

## 2023-06-16 RX ADMIN — PROMETHAZINE HYDROCHLORIDE AND CODEINE PHOSPHATE 10 ML: 6.25; 1 SOLUTION ORAL at 09:06

## 2023-06-16 RX ADMIN — LOSARTAN POTASSIUM 100 MG: 50 TABLET, FILM COATED ORAL at 09:06

## 2023-06-16 RX ADMIN — MIRTAZAPINE 15 MG: 15 TABLET, FILM COATED ORAL at 08:06

## 2023-06-16 RX ADMIN — PROMETHAZINE HYDROCHLORIDE AND CODEINE PHOSPHATE 10 ML: 6.25; 1 SOLUTION ORAL at 05:06

## 2023-06-16 RX ADMIN — LEVALBUTEROL HYDROCHLORIDE 1.25 MG: 1.25 SOLUTION, CONCENTRATE RESPIRATORY (INHALATION) at 11:06

## 2023-06-16 RX ADMIN — ATORVASTATIN CALCIUM 40 MG: 20 TABLET, FILM COATED ORAL at 09:06

## 2023-06-16 RX ADMIN — SUCRALFATE 1 G: 1 TABLET ORAL at 01:06

## 2023-06-16 RX ADMIN — GABAPENTIN 100 MG: 100 CAPSULE ORAL at 09:06

## 2023-06-16 NOTE — ASSESSMENT & PLAN NOTE
"- Ms. Talia Mueller presents with acute respiratory failure 2/2 asthmatic bronchitis exacerbation   - Patient with Hypoxic Respiratory failure which is Acute.  she is not on home oxygen but requiring oxygen here   - Signs/symptoms of respiratory failure include- tachypnea and increased work of breathing. Contributing diagnoses includes - COPD and Obesity Hypoventilation Labs and images were reviewed. Will treat underlying causes and adjust management of respiratory failure.   - COPD Exacerbation pathways initiated    - starting antibiotics given increased cough, increased sputum and change in sputum color    - will administer steroids IV [id number] due to respiratory distress and recent antibiotic use (1 month ago)    - Cough suppressant      Continue scheduled bronchodilators and home inhalers  Continue steroids.  Change to oral daily dose  Continue levaquin while sputum cultures pending.  Completed 5 day course, discontinued.  Echo grade I diastolic dysfunction  Maintains a chronic hoarse voice following her tracheostomy which was decannulated in 2022  On 2L with good sats but increased WOB.  Need to wean (vs plan for home oxygen)  99 pack year history of smoking, quit 2016  Passed a 6MWT  Pulmonary consulted - patient not improving, history of trach.  Recommended CT of neck.  Patient unable to lie flat.  Speech consulted for swallowing evaluation, recommended ENT consult, not available until next week.  CT done, see "tracheal stenosis"  Finally improving, passed 6MWT, cough better, feels close to baseline and would like to be discharged tomorrow.  "

## 2023-06-16 NOTE — ASSESSMENT & PLAN NOTE
Body mass index is 35.16 kg/m². Morbid obesity complicates all aspects of disease management from diagnostic modalities to treatment. Weight loss encouraged and health benefits explained to patient.

## 2023-06-16 NOTE — PROGRESS NOTES
Patient resting well  Oxygen in use at 2lp nasal cannula,Aerosol given with no adverse reaction Meter dose inhaler given, no adverse reaction noted at this time Bipap not in use at this time

## 2023-06-16 NOTE — ASSESSMENT & PLAN NOTE
- chronic   - home meds: amlodipine 10 mg QD, losartan 100 mg QD    Has remained very hypertensive while here, likely not helped with significant beta agonist and steroid use.  Added HCTZ, continue prn hydralazine as needed with caution for HR.   Improving with better control of cough

## 2023-06-16 NOTE — PROGRESS NOTES
"[facility]  Progress Note    Patient Name: Talia Mueller  MRN: [id number]  Patient Class: IP- Inpatient   Admission Date: 6/10/2023  Length of Stay: 4 days  Attending Physician: Estelita Corona MD  Primary Care Provider: Camilla Lomas MD        Subjective:     Principal Problem:Acute hypoxemic respiratory failure        HPI:  From H&P by Dalila White PA:  "Ms. Talia Mueller is a 58 y.o. female, with PMH of COPD, asthma, obesity, GEOVANNI, anemia, who presented to [facility] ED on 6/10/23 due to shortness of breath x 4 days. She tried using her albuterol without relief. She denied fever. EMS was called to bring her to the hospital. Upon arrival the was mildly hypotensive. She was treated with 1L IV fluids, and is now hypertensive, but her O2 sats have dropped. She was treated with DuoNebs, and steroids without significant improvement."      Overview/Hospital Course:  Patient presented with acute on chronic respiratory failure due to asthma/copd and was started on respiratory treatments with bronchodilators and IV steroids. Echo obtained to assess cardiac function showed grade I diastolic dysfunction.  Patient also had a severe cough that ultimately was treated with cough suppressants.     Pulmonary consulted given her failure to improve, severe cough, vocal cord dysfunction and recent tracheostomy/decannulation.  Recommended evaluation for tracheal stenosis with neck CT and ENT consultation.  Patient unable to lie flat for CT as she developed vertigo (chronic), and ENT not available until next week.  Agreed to attempt CT again the following day.      CT neck results showed narrowing of the airway beginning just below the level of the epiglottis and extending to the level of vocal cords, approximately 29 mm long.  Airway measured approximately 3-4 mm in AP dimension just below the epiglottis and 3 mm in transverse dimension at the level of the vocal cords.  There was a " short segment of narrowing of the airway just below the cricoid measuring about 6 mm in AP dimension felt to be at the side of the tracheostomy cannula.  The airway measured 11 mm in transverse dimension and 12 mm in AP dimension at the thoracic inlet.           Interval History: Cough better today and she feels better in general.  Had an episode of vertigo last night when CT was attempted and it was stopped.  Agreed to try again today.  Wheezing and tachycardia improved with Xopenex.    Review of Systems   Constitutional:  Negative for chills and fever.   Respiratory:  Positive for cough and shortness of breath. Negative for wheezing.         Cough much improved, has shortness of breath with activity   Cardiovascular:  Negative for chest pain and palpitations.   Objective:     Vital Signs (Most Recent):  Temp: 97.9 °F (36.6 °C) (06/16/23 1150)  Pulse: 77 (06/16/23 1600)  Resp: 18 (06/16/23 1600)  BP: 101/69 (06/16/23 1150)  SpO2: (!) 94 % (06/16/23 1150) Vital Signs (24h Range):  Temp:  [97.7 °F (36.5 °C)-98.2 °F (36.8 °C)] 97.9 °F (36.6 °C)  Pulse:  [] 77  Resp:  [18-20] 18  SpO2:  [94 %-97 %] 94 %  BP: (101-133)/() 101/69     Weight: 98.8 kg (217 lb 13 oz)  Body mass index is 35.16 kg/m².    Intake/Output Summary (Last 24 hours) at 6/16/2023 1617  Last data filed at 6/15/2023 1645  Gross per 24 hour   Intake 480 ml   Output --   Net 480 ml         Physical Exam  Constitutional:       General: She is not in acute distress.  HENT:      Nose: Congestion present.      Mouth/Throat:      Comments: Hoarse  Cardiovascular:      Rate and Rhythm: Normal rate and regular rhythm.      Heart sounds: Normal heart sounds. No murmur heard.    No gallop.   Pulmonary:      Effort: Pulmonary effort is normal.      Breath sounds: Normal breath sounds.      Comments: Occasional cough.  No wheezing noted.  Breath sounds decreased at bases.  Abdominal:      General: Bowel sounds are normal.      Palpations: Abdomen is  "soft.   Skin:     General: Skin is warm and dry.   Neurological:      General: No focal deficit present.      Mental Status: She is alert.   Psychiatric:         Mood and Affect: Mood normal.         Behavior: Behavior normal.           Significant Labs: All pertinent labs within the past 24 hours have been reviewed.    Significant Imaging: I have reviewed all pertinent imaging results/findings within the past 24 hours.      Assessment/Plan:      * Acute hypoxemic respiratory failure  - Ms. Talia Mueller presents with acute respiratory failure 2/2 asthmatic bronchitis exacerbation   - Patient with Hypoxic Respiratory failure which is Acute.  she is not on home oxygen but requiring oxygen here   - Signs/symptoms of respiratory failure include- tachypnea and increased work of breathing. Contributing diagnoses includes - COPD and Obesity Hypoventilation Labs and images were reviewed. Will treat underlying causes and adjust management of respiratory failure.   - COPD Exacerbation pathways initiated    - starting antibiotics given increased cough, increased sputum and change in sputum color    - will administer steroids IV [id number] due to respiratory distress and recent antibiotic use (1 month ago)    - Cough suppressant      Continue scheduled bronchodilators and home inhalers  Continue steroids.  Change to oral daily dose  Continue levaquin while sputum cultures pending.  Completed 5 day course, discontinued.  Echo grade I diastolic dysfunction  Maintains a chronic hoarse voice following her tracheostomy which was decannulated in 2022  On 2L with good sats but increased WOB.  Need to wean (vs plan for home oxygen)  99 pack year history of smoking, quit 2016  Passed a 6MWT  Pulmonary consulted - patient not improving, history of trach.  Recommended CT of neck.  Patient unable to lie flat.  Speech consulted for swallowing evaluation, recommended ENT consult, not available until next week.  CT done, see "tracheal " "stenosis"  Finally improving, passed 6MWT, cough better, feels close to baseline and would like to be discharged tomorrow.    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  Patient previously scheduled for laryngoscopy and MBSS this week at [facility], but was hospitalized here.  Has procedure scheduled this coming Tuesday, unclear whether this was rescheduled.  Appointment with PCP (Dr. Lomas) on Monday coming up.      Essential hypertension  - chronic   - home meds: amlodipine 10 mg QD, losartan 100 mg QD    Has remained very hypertensive while here, likely not helped with significant beta agonist and steroid use.  Added HCTZ, continue prn hydralazine as needed with caution for HR.   Improving with better control of cough    Anemia  - H&H stable, at baseline  - monitor      GEOVANNI (obstructive sleep apnea)  - CPAP ordered but patient refuses to use it.      COPD exacerbation  As above in "acute hypoxemic respiratory failure"   99 pack year history of smoking, quit 2016      VTE Risk Mitigation (From admission, onward)         Ordered     heparin (porcine) injection 5,000 Units  Every 8 hours         06/11/23 0627     IP VTE HIGH RISK PATIENT  Once         06/11/23 0627     Place sequential compression device  Until discontinued         06/11/23 0123                Discharge Planning   ITA:      Code Status: Full Code   Is the patient medically ready for discharge?:     Reason for patient still in hospital (select all that apply): Patient trending condition, Treatment and Consult recommendations  Discharge Plan A: Home Health                  Estelita Estevez MD  Department of Hospital Medicine   [facility]  "

## 2023-06-16 NOTE — ASSESSMENT & PLAN NOTE
CT shows stenosis at the level of the subglottis/trachea consistent with tracheostomy-associated stenosis.  Patient previously scheduled for laryngoscopy and MBSS this week at [facility], but was hospitalized here.  Has procedure scheduled this coming Tuesday, unclear whether this was rescheduled.  Appointment with PCP (Dr. Lomas) on Monday coming up.

## 2023-06-16 NOTE — PROGRESS NOTES
Progress Note  Pulmonary & Critical Care Medicine    Attending: Haja Saini  Fellow: Linsey Lovell  Admit Date: 6/10/2023  Today's Date: 06/16/2023      SUBJECTIVE:     NAEO. Unable to obtain CT neck yesterday 2/2 patient experiencing vertigo. Discuss scan with patient today and the importance of the scan. She stated that she would be willing to try to do the scan again today.    OBJECTIVE:     Vital Signs Trends/Hx Reviewed  Vitals:    06/16/23 0752 06/16/23 0754 06/16/23 0755 06/16/23 0758   BP: (!) 126/102      BP Location: Left arm      Patient Position: Sitting      Pulse:  89 89    Resp:  18 18    Temp:       TempSrc:       SpO2:    96%   Weight:       Height:           Ventilator settings:   No data recorded  Oxygen Concentration (%):  [28] 28        Physical Exam:  General: Morbidly obese AAF sitting up preparing to eat breakfast in NAD, cooperative & interactive.  HEENT: AT/NC, PERRL, EOMI, oral and nasal mucosa moist.   Neck: Supple without JVD or palpable LAD. Stridor/wheezing noted.  Cardiac: normal rate, regular rhythm, with no MRG with brisk cap refill and symmetric pulses in distal extremities.  Respiratory: Normal inspection. Symmetric chest rise. Normal palpation and percussion. Bilateral expiratory wheezing. No increased work of breathing noted.   Abdomen: Soft, NT/ND. +BS. Obese abdomen. No hepatosplenomegaly.   Extremities: No edema.   Neuro: Grossly intact to brief exam. Oriented x3 with appropriate mood/affect to situation.       Laboratory:  No results for input(s): PH, PCO2, PO2, HCO3, POCSATURATED, BE in the last 24 hours.  No results for input(s): WBC, RBC, HGB, HCT, PLT, MCV, MCH, MCHC in the last 24 hours.  No results for input(s): GLUCOSE, NA, K, CL, CO2, BUN, CREATININE, MG in the last 24 hours.    Invalid input(s):  CALCIUM    Microbiology Data:   Microbiology Results (last 7 days)       Procedure Component Value Units Date/Time    Culture, Respiratory with Gram Stain [[id number]]  Collected: 06/12/23 1147    Order Status: Completed Specimen: Respiratory from Sputum Updated: 06/14/23 0885     Respiratory Culture Normal respiratory dayo      No S aureus or Pseudomonas isolated.     Gram Stain (Respiratory) <10 epithelial cells per low power field.     Gram Stain (Respiratory) Many WBC's     Gram Stain (Respiratory) Many Gram negative rods     Gram Stain (Respiratory) Many Gram positive cocci             Chest Imaging:   No results found in the last 24 hours.    Scheduled Medications:    amLODIPine  10 mg Oral Daily    atorvastatin  40 mg Oral Daily    cetirizine  10 mg Oral QHS    chlorthalidone  25 mg Oral Daily    fluticasone furoate-vilanteroL  1 puff Inhalation Daily    fluticasone propionate  2 spray Each Nostril Daily    gabapentin  100 mg Oral TID    guaiFENesin  600 mg Oral BID    heparin (porcine)  5,000 Units Subcutaneous Q8H    levalbuterol  1.25 mg Nebulization Q8H    losartan  100 mg Oral Daily    mirtazapine  15 mg Oral Nightly    predniSONE  40 mg Oral Daily    risperiDONE  1 mg Oral BID    sodium chloride 0.9%  10 mL Intravenous Q8H    sucralfate  1 g Oral QID    tamsulosin  0.4 mg Oral Daily    tiotropium bromide  2 puff Inhalation Daily       PRN Medications:   acetaminophen, albuterol sulfate, clonazePAM, hydrALAZINE, melatonin, naloxone, promethazine-codeine 6.25-10 mg/5 ml, senna-docusate 8.6-50 mg, sodium chloride 0.9%, traMADoL    Problem List:   Patient Active Problem List   Diagnosis    Asthma    Status asthmaticus with COPD (chronic obstructive pulmonary disease)    Hypertensive urgency    Pneumonia of left lower lobe due to infectious organism    Acute hypoxemic respiratory failure    Asthmatic bronchitis with acute exacerbation    COPD exacerbation    Wheezing    Productive cough    Acute bronchitis    Constipation    GEOVANNI (obstructive sleep apnea)    Anemia    Essential hypertension       ASSESSMENT & RECOMMENDATIONS     #Acute COPD/Asthma Exacerbation  -Continue  "current treatment with Prednisone and nebulizer tx Q4  -If becoming tachycardic with albuterol, okay with levalbuterol   -Continue Breo   -Not hypoxic on 6 min walk test.   -Completed 5 days of abx therapy.      #GEOVANNI on CPAP  -Patient not using her CPAP at home 2/2 mask fit. States she is willing to try hospital CPAP  -Continue QHS CPAP      #Chronic Allergic Rhinitis  -Continue flonase and antihistamine     #Concern for tracheal stenosis  -Due to patient not being deemed appropriate for decannulation, but the trach dislodged on its own. There is concern for upper airway issue that initially prohibited her from being formally decannulated.  -Recommend CT neck to evaluate anatomy. Patient states that she will try the scan again today.  -Recommend ENT evaluation for airway survey including assessment for nasal polyps in evaluation of [place].      #Cough  -Appears to be related to feeling if irritation in her throat.  -Okay to continues guaifenesin.  -Okay to try tessalon perles  -See above "concern for tracheal stenosis"    Thank you for allowing us to participate in the care of this patient. We will continue to follow. Please call with questions.    Linsey Lovell M.D., PGY-V  [employer] Pulmonary/Critical Care Fellow               "

## 2023-06-16 NOTE — PT/OT/SLP PROGRESS
Speech Language Pathology      Talia HUMPHREY Mueller  MRN: [id number]    Patient not seen today secondary to Testing/imaging (xray/CT/MRI). Will follow-up 6/17 or 6/19 pending pt and therapist availability/schedule.

## 2023-06-16 NOTE — CARE UPDATE
06/15/23 7938   Patient Assessment/Suction   Level of Consciousness (AVPU) alert   Respiratory Effort Normal;Unlabored   Expansion/Accessory Muscles/Retractions no use of accessory muscles;no retractions;expansion symmetric   All Lung Fields Breath Sounds wheezes, expiratory   Rhythm/Pattern, Respiratory no shortness of breath reported   Skin Integrity   $ Wound Care Tech Time 15 min   Area Observed Left;Right;Behind ear   Skin Appearance without discoloration   PRE-TX-O2   Device (Oxygen Therapy) CPAP   $ Is the patient on Low Flow Oxygen? Yes   $ Noninvasive Daily Charge Noninvasive Daily   Oxygen Concentration (%) 28   SpO2 97 %   Pulse Oximetry Type Intermittent   $ Pulse Oximetry - Multiple Charge Pulse Oximetry - Multiple   Pulse 82   Resp 18   Aerosol Therapy   $ Aerosol Therapy Charges Aerosol Treatment   Respiratory Treatment Status (SVN) given   Treatment Route (SVN) mask;air   Patient Position (SVN) HOB elevated   Post Treatment Assessment (SVN) increased aeration   Signs of Intolerance (SVN) none   Breath Sounds Post-Respiratory Treatment   Throughout All Fields Post-Treatment All Fields   Throughout All Fields Post-Treatment wheezes, expiratory   Post-treatment Heart Rate (beats/min) 82   Post-treatment Resp Rate (breaths/min) 18   Ready to Wean/Extubation Screen   FIO2<=50 (chart decimal) 0.28   Preset CPAP/BiPAP Settings   Mode Of Delivery CPAP   $ CPAP/BiPAP Daily Charge BiPAP/CPAP Daily   $ Initial CPAP/BiPAP Setup? Yes   $ Is patient using? Yes   Size of Mask Medium/Large   Sized Appropriately? Yes   Equipment Type [employer]    Airway Device Type medium full face mask   CPAP (cm H2O) 7   ITime (sec) 1   Rise Time (sec) 3   Patient CPAP/BiPAP Settings   Timed Inspiration (Sec) 1   IPAP Rise Time (sec) 3   RR Total (Breaths/Min) 11   Tidal Volume (mL) 627   VE Minute Ventilation (L/min) 8 L/min   Peak Inspiratory Pressure (cm H2O) 7.3   Total Leak (L/Min) 29   Patient Trigger - ST Mode Only (%)  100

## 2023-06-16 NOTE — SUBJECTIVE & OBJECTIVE
Interval History: Cough better today and she feels better in general.  Had an episode of vertigo last night when CT was attempted and it was stopped.  Agreed to try again today.  Wheezing and tachycardia improved with Xopenex.    Review of Systems   Constitutional:  Negative for chills and fever.   Respiratory:  Positive for cough and shortness of breath. Negative for wheezing.         Cough much improved, has shortness of breath with activity   Cardiovascular:  Negative for chest pain and palpitations.   Objective:     Vital Signs (Most Recent):  Temp: 97.9 °F (36.6 °C) (06/16/23 1150)  Pulse: 77 (06/16/23 1600)  Resp: 18 (06/16/23 1600)  BP: 101/69 (06/16/23 1150)  SpO2: (!) 94 % (06/16/23 1150) Vital Signs (24h Range):  Temp:  [97.7 °F (36.5 °C)-98.2 °F (36.8 °C)] 97.9 °F (36.6 °C)  Pulse:  [] 77  Resp:  [18-20] 18  SpO2:  [94 %-97 %] 94 %  BP: (101-133)/() 101/69     Weight: 98.8 kg (217 lb 13 oz)  Body mass index is 35.16 kg/m².    Intake/Output Summary (Last 24 hours) at 6/16/2023 1617  Last data filed at 6/15/2023 1645  Gross per 24 hour   Intake 480 ml   Output --   Net 480 ml         Physical Exam  Constitutional:       General: She is not in acute distress.  HENT:      Nose: Congestion present.      Mouth/Throat:      Comments: Hoarse  Cardiovascular:      Rate and Rhythm: Normal rate and regular rhythm.      Heart sounds: Normal heart sounds. No murmur heard.    No gallop.   Pulmonary:      Effort: Pulmonary effort is normal.      Breath sounds: Normal breath sounds.      Comments: Occasional cough.  No wheezing noted.  Breath sounds decreased at bases.  Abdominal:      General: Bowel sounds are normal.      Palpations: Abdomen is soft.   Skin:     General: Skin is warm and dry.   Neurological:      General: No focal deficit present.      Mental Status: She is alert.   Psychiatric:         Mood and Affect: Mood normal.         Behavior: Behavior normal.           Significant Labs: All  pertinent labs within the past 24 hours have been reviewed.    Significant Imaging: I have reviewed all pertinent imaging results/findings within the past 24 hours.

## 2023-06-17 VITALS
HEART RATE: 94 BPM | BODY MASS INDEX: 35.22 KG/M2 | RESPIRATION RATE: 14 BRPM | HEIGHT: 66 IN | WEIGHT: 219.13 LBS | SYSTOLIC BLOOD PRESSURE: 117 MMHG | OXYGEN SATURATION: 99 % | DIASTOLIC BLOOD PRESSURE: 84 MMHG | TEMPERATURE: 98 F

## 2023-06-17 PROBLEM — E66.01 MORBID OBESITY: Status: ACTIVE | Noted: 2023-06-17

## 2023-06-17 PROCEDURE — 27000646 HC AEROBIKA DEVICE

## 2023-06-17 PROCEDURE — 94660 CPAP INITIATION&MGMT: CPT

## 2023-06-17 PROCEDURE — 63600175 PHARM REV CODE 636 W HCPCS: Performed by: PHYSICIAN ASSISTANT

## 2023-06-17 PROCEDURE — 99900035 HC TECH TIME PER 15 MIN (STAT)

## 2023-06-17 PROCEDURE — 25000003 PHARM REV CODE 250: Performed by: INTERNAL MEDICINE

## 2023-06-17 PROCEDURE — 99239 HOSP IP/OBS DSCHRG MGMT >30: CPT | Mod: ,,, | Performed by: HOSPITALIST

## 2023-06-17 PROCEDURE — 99239 PR HOSPITAL DISCHARGE DAY,>30 MIN: ICD-10-PCS | Mod: ,,, | Performed by: HOSPITALIST

## 2023-06-17 PROCEDURE — 94640 AIRWAY INHALATION TREATMENT: CPT

## 2023-06-17 PROCEDURE — 27000221 HC OXYGEN, UP TO 24 HOURS

## 2023-06-17 PROCEDURE — 25000242 PHARM REV CODE 250 ALT 637 W/ HCPCS: Performed by: HOSPITALIST

## 2023-06-17 PROCEDURE — A4216 STERILE WATER/SALINE, 10 ML: HCPCS | Performed by: PHYSICIAN ASSISTANT

## 2023-06-17 PROCEDURE — 25000003 PHARM REV CODE 250: Performed by: PHYSICIAN ASSISTANT

## 2023-06-17 PROCEDURE — 25000003 PHARM REV CODE 250: Performed by: HOSPITALIST

## 2023-06-17 PROCEDURE — 94664 DEMO&/EVAL PT USE INHALER: CPT

## 2023-06-17 PROCEDURE — 63600175 PHARM REV CODE 636 W HCPCS: Performed by: HOSPITALIST

## 2023-06-17 RX ORDER — SPIRONOLACTONE 25 MG/1
25 TABLET ORAL DAILY
COMMUNITY

## 2023-06-17 RX ORDER — PROMETHAZINE HYDROCHLORIDE AND CODEINE PHOSPHATE 6.25; 1 MG/5ML; MG/5ML
10 SOLUTION ORAL EVERY 6 HOURS PRN
Qty: 118 ML | Refills: 0 | Status: SHIPPED | OUTPATIENT
Start: 2023-06-17 | End: 2023-06-24

## 2023-06-17 RX ORDER — LEVALBUTEROL 1.25 MG/.5ML
1.25 SOLUTION, CONCENTRATE RESPIRATORY (INHALATION) EVERY 8 HOURS PRN
Qty: 100 EACH | Refills: 0 | Status: SHIPPED | OUTPATIENT
Start: 2023-06-17

## 2023-06-17 RX ORDER — LURASIDONE HYDROCHLORIDE 40 MG/1
80 TABLET, FILM COATED ORAL DAILY
COMMUNITY

## 2023-06-17 RX ORDER — BUDESONIDE AND FORMOTEROL FUMARATE DIHYDRATE 160; 4.5 UG/1; UG/1
2 AEROSOL RESPIRATORY (INHALATION) EVERY 12 HOURS
COMMUNITY

## 2023-06-17 RX ORDER — PREDNISONE 20 MG/1
40 TABLET ORAL DAILY
Qty: 6 TABLET | Refills: 0 | Status: SHIPPED | OUTPATIENT
Start: 2023-06-18 | End: 2023-06-21

## 2023-06-17 RX ORDER — LORATADINE 10 MG/1
10 TABLET ORAL DAILY
COMMUNITY

## 2023-06-17 RX ADMIN — TAMSULOSIN HYDROCHLORIDE 0.4 MG: 0.4 CAPSULE ORAL at 08:06

## 2023-06-17 RX ADMIN — FLUTICASONE FUROATE AND VILANTEROL TRIFENATATE 1 PUFF: 200; 25 POWDER RESPIRATORY (INHALATION) at 07:06

## 2023-06-17 RX ADMIN — RISPERIDONE 1 MG: 1 TABLET ORAL at 08:06

## 2023-06-17 RX ADMIN — PROMETHAZINE HYDROCHLORIDE AND CODEINE PHOSPHATE 10 ML: 6.25; 1 SOLUTION ORAL at 08:06

## 2023-06-17 RX ADMIN — GUAIFENESIN 600 MG: 600 TABLET, EXTENDED RELEASE ORAL at 08:06

## 2023-06-17 RX ADMIN — SUCRALFATE 1 G: 1 TABLET ORAL at 08:06

## 2023-06-17 RX ADMIN — CHLORTHALIDONE 25 MG: 25 TABLET ORAL at 08:06

## 2023-06-17 RX ADMIN — FLUTICASONE PROPIONATE 100 MCG: 50 SPRAY, METERED NASAL at 08:06

## 2023-06-17 RX ADMIN — LEVALBUTEROL HYDROCHLORIDE 1.25 MG: 1.25 SOLUTION, CONCENTRATE RESPIRATORY (INHALATION) at 07:06

## 2023-06-17 RX ADMIN — TIOTROPIUM BROMIDE INHALATION SPRAY 2 PUFF: 3.12 SPRAY, METERED RESPIRATORY (INHALATION) at 07:06

## 2023-06-17 RX ADMIN — TRAMADOL HYDROCHLORIDE 50 MG: 50 TABLET, COATED ORAL at 04:06

## 2023-06-17 RX ADMIN — HEPARIN SODIUM 5000 UNITS: 5000 INJECTION INTRAVENOUS; SUBCUTANEOUS at 05:06

## 2023-06-17 RX ADMIN — PREDNISONE 40 MG: 20 TABLET ORAL at 08:06

## 2023-06-17 RX ADMIN — AMLODIPINE BESYLATE 10 MG: 5 TABLET ORAL at 08:06

## 2023-06-17 RX ADMIN — Medication 10 ML: at 05:06

## 2023-06-17 RX ADMIN — GABAPENTIN 100 MG: 100 CAPSULE ORAL at 08:06

## 2023-06-17 RX ADMIN — ATORVASTATIN CALCIUM 40 MG: 20 TABLET, FILM COATED ORAL at 08:06

## 2023-06-17 RX ADMIN — LOSARTAN POTASSIUM 100 MG: 50 TABLET, FILM COATED ORAL at 08:06

## 2023-06-17 NOTE — DISCHARGE SUMMARY
"[facility], [place] Medicine  Discharge Summary      Patient Name: Talia Mueller  MRN: [id number]  WILLIE: [id number]  Patient Class: IP- Inpatient  Admission Date: 6/10/2023  Hospital Length of Stay: 5 days  Discharge Date and Time: 6/17/2023 12:12 PM  Attending Physician: No att. providers found   Discharging Provider: Esetlita Estevez MD  Primary Care Provider: Camilla Lomas MD    Primary Care Team: Networked reference to record PCT     HPI:   From H&P by Dalila White PA:  "Ms. Talia Mueller is a 58 y.o. female, with PMH of COPD, asthma, obesity, GEOVANNI, anemia, who [place] [facility] [place] 6/10/23 due to shortness of breath x 4 days. She tried using her albuterol without relief. She denied fever. EMS was called to bring her to the hospital. Upon arrival the was mildly hypotensive. She was treated with 1L IV fluids, and is now hypertensive, but her O2 sats have dropped. She was treated with DuoNebs, and steroids without significant improvement."            Hospital Course:   Patient presented with acute on chronic respiratory failure due to asthma/copd and was started on respiratory treatments with bronchodilators and IV steroids. Echo obtained to assess cardiac function showed grade I diastolic dysfunction.  Patient also had a severe cough that ultimately was treated with cough suppressants.     Pulmonary consulted given her failure to improve, severe cough, vocal cord dysfunction and recent tracheostomy/decannulation.  Recommended evaluation for tracheal stenosis with neck CT and ENT consultation.  Patient unable to lie flat for CT as she developed vertigo (chronic), and ENT not available until next week.  Agreed to attempt CT again the following day.      CT neck results showed narrowing of the airway beginning just below the level of the epiglottis and extending to the level of vocal cords, approximately 29 mm long.  Airway measured approximately 3-4 mm in AP dimension just " below the epiglottis and 3 mm in transverse dimension at the level of the vocal cords.  There was a short segment of narrowing of the airway just below the cricoid measuring about 6 mm in AP dimension felt to be at the side of the tracheostomy cannula.  The airway measured 11 mm in transverse dimension and 12 mm in AP dimension at the thoracic inlet.    PCC followed patient while she was here, recommended ENT consultation given findings of tracheal stenosis above.  As patient has follow up scheduled with ENT in the coming week at [facility] as well as with her PCP on Monday and ENT was not available here until next week she is OK to be discharged to follow up at [facility] as scheduled.  Patient's wheezing resolved prior to discharge and she will complete the course of oral steroids as outpatient.  Her Duonebs have been changed to Xopenex in combination with Spiriva as she tends to get tachycardic and with worsening cough with the Duonebs.  Despite the 99 pack year history of smoking her main problem seems to be the tracheal stenosis.           Goals of Care Treatment Preferences:  Code Status: Full Code      Consults:   Consults (From admission, onward)        Status Ordering Provider     Inpatient consult to Pulmonary Critical Care  Once        Provider:  Linsey Lovell MD    Completed ADDISON SALINAS            Final Active Diagnoses:    Diagnosis Date Noted POA    PRINCIPAL PROBLEM:  Acute hypoxemic respiratory failure [J96.01] 11/09/2016 Yes    Asthmatic bronchitis with acute exacerbation [J45.901] 11/09/2016 Yes    Tracheal stenosis due to tracheostomy [J95.03] 06/16/2023 Yes    Morbid obesity [E66.01] 06/17/2023 Unknown    Essential hypertension [I10] 06/11/2023 Yes    Anemia [D64.9] 09/08/2017 Yes    GEOVANNI (obstructive sleep apnea) [G47.33] 09/06/2017 Yes      Problems Resolved During this Admission:       Discharged Condition: stable    Disposition: Home or Self Care    Follow Up:   Follow-up Information      Camilla Lomas MD Follow up.    Specialty: Family Medicine  Why: As scheduled Monday 6/19  Contact information:  [address] [facility] [address] [phone]                       Patient Instructions:      Diet Adult Regular     Activity as tolerated         Medications:  Reconciled Home Medications:      Medication List      START taking these medications    levalbuterol 1.25 mg/0.5 mL nebulizer solution  Commonly known as: XOPENEX  Take 0.5 mLs (1.25 mg total) by nebulization every 8 (eight) hours as needed for Wheezing. Rescue     predniSONE 20 MG tablet  Commonly known as: DELTASONE  Take 2 tablets (40 mg total) by mouth once daily. for 3 days  Start taking on: June 18, 2023     promethazine-codeine 6.25-10 mg/5 ml 6.25-10 mg/5 mL syrup  Commonly known as: PHENERGAN with CODEINE  Take 10 mLs by mouth every 6 (six) hours as needed for Cough.     tiotropium bromide 2.5 mcg/actuation inhaler  Commonly known as: SPIRIVA RESPIMAT  Inhale 2 puffs into the lungs once daily. Controller  Start taking on: June 18, 2023        CONTINUE taking these medications    amLODIPine 10 MG tablet  Commonly known as: NORVASC  Take 1 tablet (10 mg total) by mouth once daily.     budesonide-formoterol 160-4.5 mcg 160-4.5 mcg/actuation Hfaa  Commonly known as: SYMBICORT  Inhale 2 puffs into the lungs every 12 (twelve) hours. Controller     LATUDA 40 mg Tab tablet  Generic drug: lurasidone  Take 80 mg by mouth once daily.     loratadine 10 mg tablet  Commonly known as: CLARITIN  Take 10 mg by mouth once daily.     losartan 100 MG tablet  Commonly known as: COZAAR  Take 100 mg by mouth once daily.     meclizine 25 mg tablet  Commonly known as: ANTIVERT  Take 25 mg by mouth 3 (three) times daily as needed.     spironolactone 25 MG tablet  Commonly known as: ALDACTONE  Take 25 mg by mouth once daily.     traMADoL 50 mg tablet  Commonly known as: ULTRAM  Take 1 tablet (50 mg total) by mouth 2 (two) times daily as needed for  Pain.        STOP taking these medications    albuterol 90 mcg/actuation inhaler  Commonly known as: PROVENTIL/VENTOLIN HFA     albuterol-ipratropium 2.5 mg-0.5 mg/3 mL nebulizer solution  Commonly known as: DUO-NEB            Time spent on the discharge of patient: <30 minutes         Estelita Estevez MD  Department of Hospital Medicine  [facility]

## 2023-06-17 NOTE — PLAN OF CARE
06/17/23 1123   Final Note   Assessment Type Final Discharge Note   Anticipated Discharge Disposition Home   Hospital Resources/Appts/Education Provided Provided patient/caregiver with written discharge plan information;Appointments scheduled and added to AVS   Post-Acute Status   Discharge Delays None known at this time     SW met with patient at bedside. Patient has appt scheduled with PCP on Monday 6/19. All CM needs have been met. SW contacted patient nurse for Lyft ride home.

## 2023-06-17 NOTE — PLAN OF CARE
Problem: Adult Inpatient Plan of Care  Goal: Plan of Care Review  Outcome: Ongoing, Progressing  Goal: Optimal Comfort and Wellbeing  Outcome: Ongoing, Progressing  Goal: Readiness for Transition of Care  Outcome: Ongoing, Progressing     Patient is alert and oriented x 4, able to make needs and wants known. Patient admitted for acute hypoxemic respiratory failure. SpO2 95-96% on room air, breath sounds are diminished. No acute events to note, VSS. CPAP worn overnight until approximately 0430. Patient medicated for pain PRN. Patient is tolerating her diet well. Bed is in the lowest position, bed wheels are locked, call light is within reach. Purposeful rounding completed.

## 2023-06-17 NOTE — PROGRESS NOTES
Patient resting well  On room air Bipap not in use at this time Aerosol given with no adverse reaction

## 2023-06-17 NOTE — PROGRESS NOTES
AVS given to and discussed with pt.  Printed prescription given to pt.  IV removed.  Pt packed own personal belongings.  Pt will take ride service home, house supp aware.  Transport called to escort pt from unit.

## 2023-06-23 PROCEDURE — G0179 PR HOME HEALTH MD RECERTIFICATION: ICD-10-PCS | Mod: ,,, | Performed by: HOSPITALIST

## 2023-06-23 PROCEDURE — G0179 MD RECERTIFICATION HHA PT: HCPCS | Mod: ,,, | Performed by: HOSPITALIST

## 2023-06-26 ENCOUNTER — EXTERNAL HOME HEALTH (OUTPATIENT)
Dept: HOME HEALTH SERVICES | Facility: HOSPITAL | Age: 58
End: 2023-06-26
Payer: MEDICAID